# Patient Record
Sex: FEMALE | Race: WHITE
[De-identification: names, ages, dates, MRNs, and addresses within clinical notes are randomized per-mention and may not be internally consistent; named-entity substitution may affect disease eponyms.]

---

## 2017-03-23 ENCOUNTER — HOSPITAL ENCOUNTER (EMERGENCY)
Dept: HOSPITAL 8 - ED | Age: 22
LOS: 1 days | Discharge: HOME | End: 2017-03-24
Payer: COMMERCIAL

## 2017-03-23 VITALS — HEIGHT: 69 IN | BODY MASS INDEX: 28.24 KG/M2 | WEIGHT: 190.7 LBS

## 2017-03-23 DIAGNOSIS — K29.00: Primary | ICD-10-CM

## 2017-03-23 LAB
AST SERPL-CCNC: 11 U/L (ref 15–37)
BUN SERPL-MCNC: 8 MG/DL (ref 7–18)
HGB BLD-MCNC: 12.1 G/DL (ref 11.7–16.4)

## 2017-03-23 PROCEDURE — 76700 US EXAM ABDOM COMPLETE: CPT

## 2017-03-23 PROCEDURE — 87086 URINE CULTURE/COLONY COUNT: CPT

## 2017-03-23 PROCEDURE — 83690 ASSAY OF LIPASE: CPT

## 2017-03-23 PROCEDURE — 85025 COMPLETE CBC W/AUTO DIFF WBC: CPT

## 2017-03-23 PROCEDURE — 93005 ELECTROCARDIOGRAM TRACING: CPT

## 2017-03-23 PROCEDURE — 96372 THER/PROPH/DIAG INJ SC/IM: CPT

## 2017-03-23 PROCEDURE — 99285 EMERGENCY DEPT VISIT HI MDM: CPT

## 2017-03-23 PROCEDURE — 96374 THER/PROPH/DIAG INJ IV PUSH: CPT

## 2017-03-23 PROCEDURE — 96375 TX/PRO/DX INJ NEW DRUG ADDON: CPT

## 2017-03-23 PROCEDURE — 86677 HELICOBACTER PYLORI ANTIBODY: CPT

## 2017-03-23 PROCEDURE — 84703 CHORIONIC GONADOTROPIN ASSAY: CPT

## 2017-03-23 PROCEDURE — 81001 URINALYSIS AUTO W/SCOPE: CPT

## 2017-03-23 PROCEDURE — 36415 COLL VENOUS BLD VENIPUNCTURE: CPT

## 2017-03-23 PROCEDURE — 96361 HYDRATE IV INFUSION ADD-ON: CPT

## 2017-03-23 PROCEDURE — 80053 COMPREHEN METABOLIC PANEL: CPT

## 2017-03-24 VITALS — DIASTOLIC BLOOD PRESSURE: 68 MMHG | SYSTOLIC BLOOD PRESSURE: 116 MMHG

## 2017-03-24 LAB — LG PLATELETS BLD QL SMEAR: (no result)

## 2017-04-24 ENCOUNTER — HOSPITAL ENCOUNTER (EMERGENCY)
Dept: HOSPITAL 8 - ED | Age: 22
LOS: 1 days | Discharge: HOME | End: 2017-04-25
Payer: COMMERCIAL

## 2017-04-24 VITALS — BODY MASS INDEX: 28.24 KG/M2 | HEIGHT: 69 IN | WEIGHT: 190.7 LBS

## 2017-04-24 DIAGNOSIS — G43.C0: Primary | ICD-10-CM

## 2017-04-24 PROCEDURE — 99285 EMERGENCY DEPT VISIT HI MDM: CPT

## 2017-04-24 PROCEDURE — 85025 COMPLETE CBC W/AUTO DIFF WBC: CPT

## 2017-04-24 PROCEDURE — 82040 ASSAY OF SERUM ALBUMIN: CPT

## 2017-04-24 PROCEDURE — 81001 URINALYSIS AUTO W/SCOPE: CPT

## 2017-04-24 PROCEDURE — 84703 CHORIONIC GONADOTROPIN ASSAY: CPT

## 2017-04-24 PROCEDURE — 70450 CT HEAD/BRAIN W/O DYE: CPT

## 2017-04-24 PROCEDURE — 96361 HYDRATE IV INFUSION ADD-ON: CPT

## 2017-04-24 PROCEDURE — 36415 COLL VENOUS BLD VENIPUNCTURE: CPT

## 2017-04-24 PROCEDURE — 96374 THER/PROPH/DIAG INJ IV PUSH: CPT

## 2017-04-24 PROCEDURE — 80048 BASIC METABOLIC PNL TOTAL CA: CPT

## 2017-04-24 PROCEDURE — 96375 TX/PRO/DX INJ NEW DRUG ADDON: CPT

## 2017-04-25 VITALS — DIASTOLIC BLOOD PRESSURE: 71 MMHG | SYSTOLIC BLOOD PRESSURE: 130 MMHG

## 2017-04-25 LAB — BUN SERPL-MCNC: 7 MG/DL (ref 7–18)

## 2017-05-30 ENCOUNTER — HOSPITAL ENCOUNTER (EMERGENCY)
Dept: HOSPITAL 8 - ED | Age: 22
LOS: 1 days | Discharge: HOME | End: 2017-05-31
Payer: COMMERCIAL

## 2017-05-30 VITALS — WEIGHT: 197.09 LBS | BODY MASS INDEX: 29.19 KG/M2 | HEIGHT: 69 IN
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
## 2017-05-30 DIAGNOSIS — Z3A.01: ICD-10-CM
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
## 2017-05-30 DIAGNOSIS — S30.1XXA: ICD-10-CM

## 2017-05-30 DIAGNOSIS — O9A.211: Primary | ICD-10-CM

## 2017-05-30 DIAGNOSIS — G43.909: ICD-10-CM
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
## 2017-05-30 LAB
HCG UR LOT: (no result)
HCG UR OBC: (no result)

## 2017-05-30 PROCEDURE — 84702 CHORIONIC GONADOTROPIN TEST: CPT

## 2017-05-30 PROCEDURE — 81025 URINE PREGNANCY TEST: CPT

## 2017-05-30 PROCEDURE — 36415 COLL VENOUS BLD VENIPUNCTURE: CPT

## 2017-05-30 PROCEDURE — 76830 TRANSVAGINAL US NON-OB: CPT

## 2017-05-31 VITALS — SYSTOLIC BLOOD PRESSURE: 119 MMHG | DIASTOLIC BLOOD PRESSURE: 71 MMHG

## 2017-06-25 ENCOUNTER — HOSPITAL ENCOUNTER (EMERGENCY)
Dept: HOSPITAL 8 - ED | Age: 22
Discharge: HOME | End: 2017-06-25
Payer: COMMERCIAL

## 2017-06-25 VITALS — DIASTOLIC BLOOD PRESSURE: 68 MMHG | SYSTOLIC BLOOD PRESSURE: 124 MMHG

## 2017-06-25 VITALS — HEIGHT: 69 IN | BODY MASS INDEX: 28.73 KG/M2 | WEIGHT: 194.01 LBS

## 2017-06-25 DIAGNOSIS — Z3A.01: ICD-10-CM

## 2017-06-25 DIAGNOSIS — O20.0: Primary | ICD-10-CM

## 2017-06-25 DIAGNOSIS — R10.2: ICD-10-CM

## 2017-06-25 DIAGNOSIS — O26.891: ICD-10-CM

## 2017-06-25 LAB
AST SERPL-CCNC: 15 U/L (ref 15–37)
BUN SERPL-MCNC: 5 MG/DL (ref 7–18)
DIFF TOTAL CELLS COUNTED: (no result)
VERIFY COUNTS?: YES

## 2017-06-25 PROCEDURE — 80053 COMPREHEN METABOLIC PANEL: CPT

## 2017-06-25 PROCEDURE — 85025 COMPLETE CBC W/AUTO DIFF WBC: CPT

## 2017-06-25 PROCEDURE — 36415 COLL VENOUS BLD VENIPUNCTURE: CPT

## 2017-06-25 PROCEDURE — 84702 CHORIONIC GONADOTROPIN TEST: CPT

## 2017-06-25 PROCEDURE — 87086 URINE CULTURE/COLONY COUNT: CPT

## 2017-06-25 PROCEDURE — 99285 EMERGENCY DEPT VISIT HI MDM: CPT

## 2017-06-25 PROCEDURE — 81001 URINALYSIS AUTO W/SCOPE: CPT

## 2017-06-25 PROCEDURE — 86901 BLOOD TYPING SEROLOGIC RH(D): CPT

## 2017-06-25 PROCEDURE — 76801 OB US < 14 WKS SINGLE FETUS: CPT

## 2017-09-28 ENCOUNTER — HOSPITAL ENCOUNTER (OUTPATIENT)
Dept: HOSPITAL 8 - LDOP | Age: 22
Discharge: HOME | End: 2017-09-28
Attending: OBSTETRICS & GYNECOLOGY
Payer: COMMERCIAL

## 2017-09-28 DIAGNOSIS — R10.9: ICD-10-CM

## 2017-09-28 DIAGNOSIS — O26.892: Primary | ICD-10-CM

## 2017-09-28 DIAGNOSIS — Z3A.20: ICD-10-CM

## 2017-09-28 PROCEDURE — 59025 FETAL NON-STRESS TEST: CPT

## 2017-09-28 PROCEDURE — G0463 HOSPITAL OUTPT CLINIC VISIT: HCPCS

## 2017-09-28 PROCEDURE — 99211 OFF/OP EST MAY X REQ PHY/QHP: CPT

## 2017-09-28 PROCEDURE — 87086 URINE CULTURE/COLONY COUNT: CPT

## 2017-09-28 PROCEDURE — 81001 URINALYSIS AUTO W/SCOPE: CPT

## 2017-10-14 ENCOUNTER — HOSPITAL ENCOUNTER (OUTPATIENT)
Dept: HOSPITAL 8 - LDOP | Age: 22
Discharge: HOME | End: 2017-10-14
Attending: OBSTETRICS & GYNECOLOGY
Payer: COMMERCIAL

## 2017-10-14 VITALS — BODY MASS INDEX: 27.76 KG/M2 | HEIGHT: 69 IN | WEIGHT: 187.39 LBS

## 2017-10-14 VITALS — DIASTOLIC BLOOD PRESSURE: 61 MMHG | SYSTOLIC BLOOD PRESSURE: 121 MMHG

## 2017-10-14 DIAGNOSIS — O26.892: ICD-10-CM

## 2017-10-14 DIAGNOSIS — R10.9: ICD-10-CM

## 2017-10-14 DIAGNOSIS — Z3A.20: ICD-10-CM

## 2017-10-14 DIAGNOSIS — O36.8120: Primary | ICD-10-CM

## 2017-10-14 PROCEDURE — G0463 HOSPITAL OUTPT CLINIC VISIT: HCPCS

## 2017-10-14 PROCEDURE — 59025 FETAL NON-STRESS TEST: CPT

## 2017-10-14 PROCEDURE — 99211 OFF/OP EST MAY X REQ PHY/QHP: CPT

## 2017-10-22 ENCOUNTER — HOSPITAL ENCOUNTER (EMERGENCY)
Dept: HOSPITAL 8 - ED | Age: 22
Discharge: HOME | End: 2017-10-22
Payer: COMMERCIAL

## 2017-10-22 VITALS — HEIGHT: 69 IN | BODY MASS INDEX: 28.31 KG/M2 | WEIGHT: 191.14 LBS

## 2017-10-22 VITALS — DIASTOLIC BLOOD PRESSURE: 47 MMHG | SYSTOLIC BLOOD PRESSURE: 112 MMHG

## 2017-10-22 DIAGNOSIS — G43.909: ICD-10-CM

## 2017-10-22 DIAGNOSIS — O99.282: ICD-10-CM

## 2017-10-22 DIAGNOSIS — O99.352: Primary | ICD-10-CM

## 2017-10-22 DIAGNOSIS — R55: ICD-10-CM

## 2017-10-22 DIAGNOSIS — E86.0: ICD-10-CM

## 2017-10-22 DIAGNOSIS — Z3A.24: ICD-10-CM

## 2017-10-22 LAB
AST SERPL-CCNC: 23 U/L (ref 15–37)
BUN SERPL-MCNC: 5 MG/DL (ref 7–18)
HCT VFR BLD CALC: 35.5 % (ref 34.6–47.8)
HGB BLD-MCNC: 11.5 G/DL (ref 11.7–16.4)
WBC # BLD AUTO: 12.6 X10^3/UL (ref 3.4–10)

## 2017-10-22 PROCEDURE — 81003 URINALYSIS AUTO W/O SCOPE: CPT

## 2017-10-22 PROCEDURE — 80053 COMPREHEN METABOLIC PANEL: CPT

## 2017-10-22 PROCEDURE — 99285 EMERGENCY DEPT VISIT HI MDM: CPT

## 2017-10-22 PROCEDURE — 96374 THER/PROPH/DIAG INJ IV PUSH: CPT

## 2017-10-22 PROCEDURE — 85025 COMPLETE CBC W/AUTO DIFF WBC: CPT

## 2017-10-22 PROCEDURE — 36415 COLL VENOUS BLD VENIPUNCTURE: CPT

## 2017-10-22 PROCEDURE — 96361 HYDRATE IV INFUSION ADD-ON: CPT

## 2017-10-22 PROCEDURE — 93005 ELECTROCARDIOGRAM TRACING: CPT

## 2017-12-13 ENCOUNTER — HOSPITAL ENCOUNTER (OUTPATIENT)
Dept: HOSPITAL 8 - LDOP | Age: 22
Discharge: HOME | End: 2017-12-13
Attending: OBSTETRICS & GYNECOLOGY
Payer: COMMERCIAL

## 2017-12-13 VITALS — SYSTOLIC BLOOD PRESSURE: 106 MMHG | DIASTOLIC BLOOD PRESSURE: 57 MMHG

## 2017-12-13 DIAGNOSIS — Z3A.31: ICD-10-CM

## 2017-12-13 LAB
A1 MICROGLOB PLACENTAL VAG QL: NEGATIVE
AMNI LOT: (no result)
AMNI OBC: (no result)

## 2017-12-13 PROCEDURE — 59025 FETAL NON-STRESS TEST: CPT

## 2017-12-13 PROCEDURE — 84112 EVAL AMNIOTIC FLUID PROTEIN: CPT

## 2017-12-13 PROCEDURE — 99211 OFF/OP EST MAY X REQ PHY/QHP: CPT

## 2017-12-13 PROCEDURE — 87086 URINE CULTURE/COLONY COUNT: CPT

## 2017-12-13 PROCEDURE — G0463 HOSPITAL OUTPT CLINIC VISIT: HCPCS

## 2017-12-13 PROCEDURE — 81001 URINALYSIS AUTO W/SCOPE: CPT

## 2017-12-17 ENCOUNTER — HOSPITAL ENCOUNTER (OUTPATIENT)
Dept: HOSPITAL 8 - LDOP | Age: 22
Discharge: HOME | End: 2017-12-17
Attending: OBSTETRICS & GYNECOLOGY
Payer: COMMERCIAL

## 2017-12-17 VITALS — BODY MASS INDEX: 28.35 KG/M2 | WEIGHT: 191.38 LBS | HEIGHT: 69 IN

## 2017-12-17 VITALS — DIASTOLIC BLOOD PRESSURE: 70 MMHG | SYSTOLIC BLOOD PRESSURE: 133 MMHG

## 2017-12-17 DIAGNOSIS — R10.9: ICD-10-CM

## 2017-12-17 DIAGNOSIS — Z3A.32: ICD-10-CM

## 2017-12-17 DIAGNOSIS — O23.593: ICD-10-CM

## 2017-12-17 DIAGNOSIS — M54.9: ICD-10-CM

## 2017-12-17 DIAGNOSIS — O26.893: Primary | ICD-10-CM

## 2017-12-17 LAB
A1 MICROGLOB PLACENTAL VAG QL: NEGATIVE
AMNI LOT: (no result)
AMNI OBC: (no result)
T VAGINALIS RRNA GENITAL QL PROBE: (no result)

## 2017-12-17 PROCEDURE — 87210 SMEAR WET MOUNT SALINE/INK: CPT

## 2017-12-17 PROCEDURE — 59025 FETAL NON-STRESS TEST: CPT

## 2017-12-17 PROCEDURE — 87086 URINE CULTURE/COLONY COUNT: CPT

## 2017-12-17 PROCEDURE — 87808 TRICHOMONAS ASSAY W/OPTIC: CPT

## 2017-12-17 PROCEDURE — G0463 HOSPITAL OUTPT CLINIC VISIT: HCPCS

## 2017-12-17 PROCEDURE — 81001 URINALYSIS AUTO W/SCOPE: CPT

## 2017-12-17 PROCEDURE — 84112 EVAL AMNIOTIC FLUID PROTEIN: CPT

## 2017-12-17 PROCEDURE — 99211 OFF/OP EST MAY X REQ PHY/QHP: CPT

## 2017-12-22 ENCOUNTER — HOSPITAL ENCOUNTER (OUTPATIENT)
Dept: HOSPITAL 8 - LDOP | Age: 22
Discharge: HOME | End: 2017-12-22
Attending: OBSTETRICS & GYNECOLOGY
Payer: COMMERCIAL

## 2017-12-22 VITALS — DIASTOLIC BLOOD PRESSURE: 76 MMHG | SYSTOLIC BLOOD PRESSURE: 125 MMHG

## 2017-12-22 DIAGNOSIS — Z3A.32: ICD-10-CM

## 2017-12-22 LAB
A1 MICROGLOB PLACENTAL VAG QL: NEGATIVE
AMNI LOT: (no result)
AMNI OBC: (no result)

## 2017-12-22 PROCEDURE — 84112 EVAL AMNIOTIC FLUID PROTEIN: CPT

## 2017-12-22 PROCEDURE — 99211 OFF/OP EST MAY X REQ PHY/QHP: CPT

## 2017-12-22 PROCEDURE — 89060 EXAM SYNOVIAL FLUID CRYSTALS: CPT

## 2017-12-22 PROCEDURE — 59025 FETAL NON-STRESS TEST: CPT

## 2017-12-22 PROCEDURE — 76815 OB US LIMITED FETUS(S): CPT

## 2017-12-22 PROCEDURE — G0463 HOSPITAL OUTPT CLINIC VISIT: HCPCS

## 2018-01-07 ENCOUNTER — HOSPITAL ENCOUNTER (OUTPATIENT)
Dept: HOSPITAL 8 - LDOP | Age: 23
LOS: 1 days | Discharge: HOME | End: 2018-01-08
Attending: OBSTETRICS & GYNECOLOGY
Payer: MEDICAID

## 2018-01-07 VITALS — DIASTOLIC BLOOD PRESSURE: 78 MMHG | SYSTOLIC BLOOD PRESSURE: 129 MMHG

## 2018-01-07 DIAGNOSIS — Z3A.36: ICD-10-CM

## 2018-01-07 DIAGNOSIS — M54.9: ICD-10-CM

## 2018-01-07 DIAGNOSIS — O26.893: ICD-10-CM

## 2018-01-07 DIAGNOSIS — R10.9: ICD-10-CM

## 2018-01-07 PROCEDURE — 59025 FETAL NON-STRESS TEST: CPT

## 2018-01-07 PROCEDURE — 99211 OFF/OP EST MAY X REQ PHY/QHP: CPT

## 2018-01-07 PROCEDURE — 76815 OB US LIMITED FETUS(S): CPT

## 2018-01-07 PROCEDURE — 89060 EXAM SYNOVIAL FLUID CRYSTALS: CPT

## 2018-01-07 PROCEDURE — G0463 HOSPITAL OUTPT CLINIC VISIT: HCPCS

## 2018-01-17 ENCOUNTER — HOSPITAL ENCOUNTER (EMERGENCY)
Dept: HOSPITAL 8 - ED | Age: 23
Discharge: HOME | End: 2018-01-17
Payer: COMMERCIAL

## 2018-01-17 VITALS — HEIGHT: 69 IN | BODY MASS INDEX: 29.39 KG/M2 | WEIGHT: 198.42 LBS

## 2018-01-17 VITALS — DIASTOLIC BLOOD PRESSURE: 68 MMHG | SYSTOLIC BLOOD PRESSURE: 124 MMHG

## 2018-01-17 DIAGNOSIS — R73.09: ICD-10-CM

## 2018-01-17 DIAGNOSIS — G43.909: ICD-10-CM
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
## 2018-01-17 DIAGNOSIS — Z3A.36: ICD-10-CM
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
## 2018-01-17 DIAGNOSIS — S79.921A: ICD-10-CM
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
## 2018-01-17 DIAGNOSIS — O26.893: Primary | ICD-10-CM

## 2018-01-17 DIAGNOSIS — S79.922A: ICD-10-CM

## 2018-01-17 PROCEDURE — 82962 GLUCOSE BLOOD TEST: CPT

## 2018-01-17 PROCEDURE — 99281 EMR DPT VST MAYX REQ PHY/QHP: CPT

## 2018-01-23 ENCOUNTER — HOSPITAL ENCOUNTER (OUTPATIENT)
Dept: HOSPITAL 8 - LDOP | Age: 23
Discharge: HOME | End: 2018-01-23
Attending: OBSTETRICS & GYNECOLOGY
Payer: COMMERCIAL

## 2018-01-23 VITALS — BODY MASS INDEX: 29.68 KG/M2 | WEIGHT: 200.4 LBS | HEIGHT: 69 IN

## 2018-01-23 VITALS — SYSTOLIC BLOOD PRESSURE: 120 MMHG | DIASTOLIC BLOOD PRESSURE: 71 MMHG

## 2018-01-23 DIAGNOSIS — Z3A.37: ICD-10-CM

## 2018-01-23 DIAGNOSIS — O26.893: Primary | ICD-10-CM

## 2018-01-23 DIAGNOSIS — R10.9: ICD-10-CM

## 2018-01-23 PROCEDURE — 89060 EXAM SYNOVIAL FLUID CRYSTALS: CPT

## 2018-01-23 PROCEDURE — 99211 OFF/OP EST MAY X REQ PHY/QHP: CPT

## 2018-01-23 PROCEDURE — G0463 HOSPITAL OUTPT CLINIC VISIT: HCPCS

## 2018-01-23 PROCEDURE — 59025 FETAL NON-STRESS TEST: CPT

## 2018-02-03 ENCOUNTER — HOSPITAL ENCOUNTER (INPATIENT)
Facility: MEDICAL CENTER | Age: 23
LOS: 2 days | End: 2018-02-05
Attending: OBSTETRICS & GYNECOLOGY | Admitting: OBSTETRICS & GYNECOLOGY
Payer: COMMERCIAL

## 2018-02-03 LAB
BASOPHILS # BLD AUTO: 0.5 % (ref 0–1.8)
BASOPHILS # BLD: 0.03 K/UL (ref 0–0.12)
EOSINOPHIL # BLD AUTO: 0.05 K/UL (ref 0–0.51)
EOSINOPHIL NFR BLD: 0.8 % (ref 0–6.9)
ERYTHROCYTE [DISTWIDTH] IN BLOOD BY AUTOMATED COUNT: 40.6 FL (ref 35.9–50)
HCT VFR BLD AUTO: 30 % (ref 37–47)
HGB BLD-MCNC: 9.3 G/DL (ref 12–16)
HOLDING TUBE BB 8507: NORMAL
IMM GRANULOCYTES # BLD AUTO: 0.04 K/UL (ref 0–0.11)
IMM GRANULOCYTES NFR BLD AUTO: 0.6 % (ref 0–0.9)
LYMPHOCYTES # BLD AUTO: 2.58 K/UL (ref 1–4.8)
LYMPHOCYTES NFR BLD: 40.4 % (ref 22–41)
MCH RBC QN AUTO: 21.1 PG (ref 27–33)
MCHC RBC AUTO-ENTMCNC: 31 G/DL (ref 33.6–35)
MCV RBC AUTO: 68 FL (ref 81.4–97.8)
MONOCYTES # BLD AUTO: 0.55 K/UL (ref 0–0.85)
MONOCYTES NFR BLD AUTO: 8.6 % (ref 0–13.4)
NEUTROPHILS # BLD AUTO: 3.14 K/UL (ref 2–7.15)
NEUTROPHILS NFR BLD: 49.1 % (ref 44–72)
NRBC # BLD AUTO: 0 K/UL
NRBC BLD-RTO: 0 /100 WBC
PLATELET # BLD AUTO: 142 K/UL (ref 164–446)
RBC # BLD AUTO: 4.41 M/UL (ref 4.2–5.4)
WBC # BLD AUTO: 6.4 K/UL (ref 4.8–10.8)

## 2018-02-03 PROCEDURE — 304965 HCHG RECOVERY SERVICES

## 2018-02-03 PROCEDURE — 700111 HCHG RX REV CODE 636 W/ 250 OVERRIDE (IP): Performed by: OBSTETRICS & GYNECOLOGY

## 2018-02-03 PROCEDURE — 303615 HCHG EPIDURAL/SPINAL ANESTHESIA FOR LABOR

## 2018-02-03 PROCEDURE — 770002 HCHG ROOM/CARE - OB PRIVATE (112)

## 2018-02-03 PROCEDURE — 4A1HXCZ MONITORING OF PRODUCTS OF CONCEPTION, CARDIAC RATE, EXTERNAL APPROACH: ICD-10-PCS | Performed by: OBSTETRICS & GYNECOLOGY

## 2018-02-03 PROCEDURE — 85025 COMPLETE CBC W/AUTO DIFF WBC: CPT

## 2018-02-03 PROCEDURE — 700105 HCHG RX REV CODE 258: Performed by: OBSTETRICS & GYNECOLOGY

## 2018-02-03 PROCEDURE — 36415 COLL VENOUS BLD VENIPUNCTURE: CPT

## 2018-02-03 PROCEDURE — 59409 OBSTETRICAL CARE: CPT

## 2018-02-03 PROCEDURE — 700102 HCHG RX REV CODE 250 W/ 637 OVERRIDE(OP): Performed by: OBSTETRICS & GYNECOLOGY

## 2018-02-03 PROCEDURE — A9270 NON-COVERED ITEM OR SERVICE: HCPCS | Performed by: OBSTETRICS & GYNECOLOGY

## 2018-02-03 PROCEDURE — 700105 HCHG RX REV CODE 258

## 2018-02-03 PROCEDURE — 700111 HCHG RX REV CODE 636 W/ 250 OVERRIDE (IP)

## 2018-02-03 RX ORDER — IBUPROFEN 600 MG/1
600 TABLET ORAL EVERY 6 HOURS PRN
Status: DISCONTINUED | OUTPATIENT
Start: 2018-02-03 | End: 2018-02-05 | Stop reason: HOSPADM

## 2018-02-03 RX ORDER — ALUMINA, MAGNESIA, AND SIMETHICONE 2400; 2400; 240 MG/30ML; MG/30ML; MG/30ML
30 SUSPENSION ORAL EVERY 6 HOURS PRN
Status: DISCONTINUED | OUTPATIENT
Start: 2018-02-03 | End: 2018-02-03 | Stop reason: HOSPADM

## 2018-02-03 RX ORDER — MISOPROSTOL 200 UG/1
800 TABLET ORAL
Status: COMPLETED | OUTPATIENT
Start: 2018-02-03 | End: 2018-02-03

## 2018-02-03 RX ORDER — OXYTOCIN 10 [USP'U]/ML
INJECTION, SOLUTION INTRAMUSCULAR; INTRAVENOUS
Status: COMPLETED
Start: 2018-02-03 | End: 2018-02-03

## 2018-02-03 RX ORDER — ONDANSETRON 2 MG/ML
4 INJECTION INTRAMUSCULAR; INTRAVENOUS EVERY 6 HOURS PRN
Status: DISCONTINUED | OUTPATIENT
Start: 2018-02-03 | End: 2018-02-05 | Stop reason: HOSPADM

## 2018-02-03 RX ORDER — SODIUM CHLORIDE, SODIUM LACTATE, POTASSIUM CHLORIDE, CALCIUM CHLORIDE 600; 310; 30; 20 MG/100ML; MG/100ML; MG/100ML; MG/100ML
INJECTION, SOLUTION INTRAVENOUS PRN
Status: DISCONTINUED | OUTPATIENT
Start: 2018-02-03 | End: 2018-02-05 | Stop reason: HOSPADM

## 2018-02-03 RX ORDER — ROPIVACAINE HYDROCHLORIDE 2 MG/ML
INJECTION, SOLUTION EPIDURAL; INFILTRATION; PERINEURAL
Status: COMPLETED
Start: 2018-02-03 | End: 2018-02-03

## 2018-02-03 RX ORDER — SODIUM CHLORIDE, SODIUM LACTATE, POTASSIUM CHLORIDE, CALCIUM CHLORIDE 600; 310; 30; 20 MG/100ML; MG/100ML; MG/100ML; MG/100ML
INJECTION, SOLUTION INTRAVENOUS CONTINUOUS
Status: DISPENSED | OUTPATIENT
Start: 2018-02-03 | End: 2018-02-03

## 2018-02-03 RX ORDER — ONDANSETRON 2 MG/ML
INJECTION INTRAMUSCULAR; INTRAVENOUS
Status: COMPLETED
Start: 2018-02-03 | End: 2018-02-03

## 2018-02-03 RX ORDER — OXYCODONE HYDROCHLORIDE AND ACETAMINOPHEN 5; 325 MG/1; MG/1
1-2 TABLET ORAL EVERY 4 HOURS PRN
Status: DISCONTINUED | OUTPATIENT
Start: 2018-02-03 | End: 2018-02-05 | Stop reason: HOSPADM

## 2018-02-03 RX ORDER — ACETAMINOPHEN 325 MG/1
650 TABLET ORAL EVERY 4 HOURS PRN
Status: DISCONTINUED | OUTPATIENT
Start: 2018-02-03 | End: 2018-02-05 | Stop reason: HOSPADM

## 2018-02-03 RX ORDER — METHYLERGONOVINE MALEATE 0.2 MG/ML
0.2 INJECTION INTRAVENOUS
Status: DISCONTINUED | OUTPATIENT
Start: 2018-02-03 | End: 2018-02-05 | Stop reason: HOSPADM

## 2018-02-03 RX ORDER — METHYLERGONOVINE MALEATE 0.2 MG/ML
0.2 INJECTION INTRAVENOUS
Status: COMPLETED | OUTPATIENT
Start: 2018-02-03 | End: 2018-02-03

## 2018-02-03 RX ORDER — DOCUSATE SODIUM 100 MG/1
100 CAPSULE, LIQUID FILLED ORAL 2 TIMES DAILY PRN
Status: DISCONTINUED | OUTPATIENT
Start: 2018-02-03 | End: 2018-02-05 | Stop reason: HOSPADM

## 2018-02-03 RX ORDER — SODIUM CHLORIDE, SODIUM LACTATE, POTASSIUM CHLORIDE, CALCIUM CHLORIDE 600; 310; 30; 20 MG/100ML; MG/100ML; MG/100ML; MG/100ML
INJECTION, SOLUTION INTRAVENOUS
Status: COMPLETED
Start: 2018-02-03 | End: 2018-02-03

## 2018-02-03 RX ORDER — DEXTROSE, SODIUM CHLORIDE, SODIUM LACTATE, POTASSIUM CHLORIDE, AND CALCIUM CHLORIDE 5; .6; .31; .03; .02 G/100ML; G/100ML; G/100ML; G/100ML; G/100ML
INJECTION, SOLUTION INTRAVENOUS CONTINUOUS
Status: DISCONTINUED | OUTPATIENT
Start: 2018-02-03 | End: 2018-02-03 | Stop reason: HOSPADM

## 2018-02-03 RX ADMIN — Medication 2000 ML/HR: at 16:59

## 2018-02-03 RX ADMIN — IBUPROFEN 600 MG: 600 TABLET, FILM COATED ORAL at 20:55

## 2018-02-03 RX ADMIN — MISOPROSTOL 800 MCG: 200 TABLET ORAL at 17:02

## 2018-02-03 RX ADMIN — ONDANSETRON 4 MG: 2 INJECTION INTRAMUSCULAR; INTRAVENOUS at 16:00

## 2018-02-03 RX ADMIN — OXYTOCIN 10 UNITS: 10 INJECTION, SOLUTION INTRAMUSCULAR; INTRAVENOUS at 17:09

## 2018-02-03 RX ADMIN — SODIUM CHLORIDE, POTASSIUM CHLORIDE, SODIUM LACTATE AND CALCIUM CHLORIDE: 600; 310; 30; 20 INJECTION, SOLUTION INTRAVENOUS at 14:15

## 2018-02-03 RX ADMIN — Medication 1 MILLI-UNITS/MIN: at 08:00

## 2018-02-03 RX ADMIN — SODIUM CHLORIDE, POTASSIUM CHLORIDE, SODIUM LACTATE AND CALCIUM CHLORIDE 1000 ML: 600; 310; 30; 20 INJECTION, SOLUTION INTRAVENOUS at 07:18

## 2018-02-03 RX ADMIN — SODIUM CHLORIDE, POTASSIUM CHLORIDE, SODIUM LACTATE AND CALCIUM CHLORIDE: 600; 310; 30; 20 INJECTION, SOLUTION INTRAVENOUS at 13:30

## 2018-02-03 RX ADMIN — OXYCODONE HYDROCHLORIDE AND ACETAMINOPHEN 1 TABLET: 5; 325 TABLET ORAL at 20:55

## 2018-02-03 RX ADMIN — ROPIVACAINE HYDROCHLORIDE 100 ML: 2 INJECTION, SOLUTION EPIDURAL; INFILTRATION; PERINEURAL at 13:35

## 2018-02-03 RX ADMIN — Medication 125 ML/HR: at 17:19

## 2018-02-03 RX ADMIN — METHYLERGONOVINE MALEATE 0.2 MG: 0.2 INJECTION, SOLUTION INTRAMUSCULAR; INTRAVENOUS at 17:09

## 2018-02-03 ASSESSMENT — LIFESTYLE VARIABLES
EVER_SMOKED: NEVER
ALCOHOL_USE: NO
DO YOU DRINK ALCOHOL: NO

## 2018-02-03 ASSESSMENT — PAIN SCALES - GENERAL
PAINLEVEL_OUTOF10: 0
PAINLEVEL_OUTOF10: 5

## 2018-02-03 NOTE — H&P
"  History and Physical    Tiara Martin is a 22 y.o. female  -Para:   No obstetric history on file.  Gestational Age:  39w0d  Admitted for:   Induction of Labor  Admitted to  [facility] Delivery.  Patient received prenatal care: Dr. Barros    HPI: Patient is admitted with the above mentioned Chief Complaint and States   Loss of fluid:   negative  Abdominal Pain:  positive  Uterine Contractions:  positive  Vaginal Bleeding:  negative  Fetal Movement:  normal  Patient denies fever, chills, nausea, vomiting , headache, visual disturbance, or dysuria  No LMP recorded.  Estimated Date of Delivery: None noted.  Final DAVID: Not found.    There are no active problems to display for this patient.      Admitting DX: Pregnancy  IOL  Indication for care in labor or delivery  Pregnancy Complications:  none  OB Risk Factors:   Short interval from last pregnancy  Labor State:    Early latent labor.    History:   has no past medical history on file.     has no past surgical history on file.    OB History   No data available       Medications:  No current facility-administered medications on file prior to encounter.      Current Outpatient Prescriptions on File Prior to Encounter   Medication Sig Dispense Refill   • NON SPECIFIED BCP          Allergies:  Patient has no known allergies.    ROS:   Neuro: negative    Cardiovascular: negative  Gastro intestinal: negative  Genitourinary: positive for vaginal discharge            Physical Exam:  /58   Pulse 68   Temp 36.1 °C (97 °F)   Resp 16   Ht 1.753 m (5' 9\")   Wt 91.2 kg (201 lb)   BMI 29.68 kg/m²   Constitutional: healthy-appearing        Cardio: regular rate and rhythm, S1, S2 normal, no murmur, click, rub or gallop  Lung: unlabored respirations, no intercostal retractions or accessory muscle use  Abdomen: gravid abdomen   Extremity: extremities, peripheral pulses and reflexes normal    Cervical Exam: 90%  Cervix Dilatation: 4  Station: positive " 0  Pelvis: Normal  Fetal Assessment: Fetal heart variability: moderate  Estimated Fetal Weight: 3000 - 3500g      Labs:  Recent Labs      18   0715   WBC  6.4   RBC  4.41   HEMOGLOBIN  9.3*   HEMATOCRIT  30.0*   MCV  68.0*   MCH  21.1*   MCHC  31.0*   RDW  40.6   PLATELETCT  142*     Prenatal Results    The patient does not have an associated pregnancy episode and working DAVID. Some results will not display without a pregnancy episode and working DAVID.   1st Trimester     Test Value Date Time    ABO       RH       Antibody       CBC/PLT/DIFF       HGB       Platelets       HGB A1C        1 Hr GCT       3 Hr GTT       Rubella       RPR       Urine Culture       24 Urine Protein        24 Urine Creatinine        HBsAg       Hep CAB        HIV       Gonorrhea       Chlamydia       TSH        Free T4         TB       Pap       SYPHILUS TREP QUAL [id number] [[id number]][             2nd Trimester     Test Value Date Time    HCT       HGB       1 Hr GCT       3 Hr GTT             24-28 Weeks     Test Value Date Time    1 Hr GCT        TSH        Free T4        24 Urine Protein       24 Urine Creatinine       BUN       Creatinine       GFR       AST       ALT       Uric Acid       LDH             3rd Trimester     Test Value Date Time    HCT       HGB       TSH       Free T4       24 Hr Urine Protein       24 Hr Urine Creatinine       SYPHILUS TREP QUAL             35-37 Weeks     Test Value Date Time    GBS PCR LB       GBS PCR             Genetic Screening     Test Value Date Time    Cystic Fibrosis       AFP Quad       Sickle Cell                     Assessment:  Gestational Age:  39w0d  Labor State:   Labor, Active  Risk Factors:   Short pregnancy interval  Pregnancy Complications: none    There are no active problems to display for this patient.      Plan:   Admitted for: Induction of Labor  TEJINDER Barros M.D.

## 2018-02-03 NOTE — PROGRESS NOTES
0620-Pt presents to unit for IOL. EDC 2/10/2018 39wks. Denies VB or LOF or Uc. Reports +FM. EFM and TOCO applied.   0645-Bedside report given to DALIA Swanson.

## 2018-02-03 NOTE — PROGRESS NOTES
"S/ very comfortable with contractions  O/  Vitals:    02/03/18 0625 02/03/18 0832   BP:  114/58   Pulse:  68   Resp:  16   Temp:  36.1 °C (97 °F)   Weight: 91.2 kg (201 lb)    Height: 1.753 m (5' 9\")      Sve-8/c/0, moderate bleeding on peripad  efm-cat 1  toco-q2-3  Pit at 13mu    A&P/ labor progressing well  Will follow.  "

## 2018-02-03 NOTE — PROGRESS NOTES
"; EDC 2/10; EGA 39    1220 - Report from TACHO Swanson RN. Pt resting comfortably in bed. Pt states that she is \"barely\" feeling her UCs at this time. Discussed pain management options, pt will let RN know when she is ready for intervention. Pitocin running per active order. Family at bedside. POC discussed, questions answered.   1300 - SROM, bloody fluid.   1317 - Dr. Barros at bedside. SVE 4/80/-1. Pt requesting epidural. Bolus started.   1330 - Dr. Brennan at bedside to place epidural.   1341 - Test dose given, pt tolerated well.   1400 - Schaeffer placed, pt tolerated well. SVE 5-6/90/-1  1520 - Dr. Barros at bedside. SVE 8/100/0. ARom of forebag, bloody fluid with clots.   1600 - Pt feeling nauseous, requesting zofran. Also feeling pressure. SVE 9/100/0, bloody show with clots present. Dr. Thompson called and updated, orders received.   1650 - Dr. Barros at bedside, SVE C/100/+1. Pushing with MD and RN  1655 -  of viable infant, 8/9 APGARs. No lacerations. Cytotec, IM pit, methergine given per MD order.    - Delivery of placenta   - Dr. Barros at bedside to assess pt. Fundus firm, 1 below U, bleeding light   - Report to SAKINA Casiano RN          "

## 2018-02-03 NOTE — CARE PLAN
Problem: Risk for Fluid Imbalance  Goal: Promotion of Fluid Balance    Intervention: Promote oral intake as appropriate  Water at BS      Problem: Risk for Infection, Impaired Wound Healing  Goal: Remain free from signs and symptoms of infection    Intervention: Infection prevention measures  Hand hygiene before and after pt care

## 2018-02-04 LAB
ERYTHROCYTE [DISTWIDTH] IN BLOOD BY AUTOMATED COUNT: 40.1 FL (ref 35.9–50)
HCT VFR BLD AUTO: 26.9 % (ref 37–47)
HGB BLD-MCNC: 8.2 G/DL (ref 12–16)
MCH RBC QN AUTO: 20.3 PG (ref 27–33)
MCHC RBC AUTO-ENTMCNC: 30.5 G/DL (ref 33.6–35)
MCV RBC AUTO: 66.7 FL (ref 81.4–97.8)
MORPHOLOGY BLD-IMP: NORMAL
PLATELET # BLD AUTO: 114 K/UL (ref 164–446)
RBC # BLD AUTO: 4.03 M/UL (ref 4.2–5.4)
WBC # BLD AUTO: 8.9 K/UL (ref 4.8–10.8)

## 2018-02-04 PROCEDURE — 85027 COMPLETE CBC AUTOMATED: CPT

## 2018-02-04 PROCEDURE — A9270 NON-COVERED ITEM OR SERVICE: HCPCS | Performed by: OBSTETRICS & GYNECOLOGY

## 2018-02-04 PROCEDURE — 700102 HCHG RX REV CODE 250 W/ 637 OVERRIDE(OP): Performed by: OBSTETRICS & GYNECOLOGY

## 2018-02-04 PROCEDURE — 770002 HCHG ROOM/CARE - OB PRIVATE (112)

## 2018-02-04 PROCEDURE — 36415 COLL VENOUS BLD VENIPUNCTURE: CPT

## 2018-02-04 RX ADMIN — IBUPROFEN 600 MG: 600 TABLET, FILM COATED ORAL at 07:31

## 2018-02-04 RX ADMIN — OXYCODONE HYDROCHLORIDE AND ACETAMINOPHEN 2 TABLET: 5; 325 TABLET ORAL at 09:59

## 2018-02-04 RX ADMIN — IBUPROFEN 600 MG: 600 TABLET, FILM COATED ORAL at 19:49

## 2018-02-04 ASSESSMENT — PAIN SCALES - GENERAL
PAINLEVEL_OUTOF10: 3
PAINLEVEL_OUTOF10: 7
PAINLEVEL_OUTOF10: 2
PAINLEVEL_OUTOF10: 2
PAINLEVEL_OUTOF10: 5
PAINLEVEL_OUTOF10: 3
PAINLEVEL_OUTOF10: 2

## 2018-02-04 ASSESSMENT — COPD QUESTIONNAIRES
DURING THE PAST 4 WEEKS HOW MUCH DID YOU FEEL SHORT OF BREATH: NONE/LITTLE OF THE TIME
DO YOU EVER COUGH UP ANY MUCUS OR PHLEGM?: NO/ONLY WITH OCCASIONAL COLDS OR INFECTIONS
HAVE YOU SMOKED AT LEAST 100 CIGARETTES IN YOUR ENTIRE LIFE: NO/DON'T KNOW

## 2018-02-04 NOTE — CARE PLAN
Problem: Potential for postpartum infection related to presence of episiotomy/vaginal tear and/or uterine contamination  Goal: Patient will be absent from signs and symptoms of infection  Outcome: PROGRESSING AS EXPECTED  No signs of infection noted at time of assessment    Problem: Alteration in comfort related to episiotomy, vaginal repair and/or after birth pains  Goal: Patient verbalizes acceptable pain level  Outcome: PROGRESSING AS EXPECTED  Pt claims to have good pain relief with p.o medications

## 2018-02-04 NOTE — PROGRESS NOTES
PPD#1 s/p   S. Feels ok, tired, less vb, baby latching well, Edel pos so will stay  O.  Vitals:    18 2008 18 0000 18 0400 18 0800   BP: 117/75 128/75 114/61 105/59   Pulse: 84 74 63 68   Resp:    Temp: 36.8 °C (98.2 °F) 37.2 °C (98.9 °F) 36.3 °C (97.4 °F) 36.6 °C (97.8 °F)   SpO2: 98% 96% 99% 96%   Weight:       Height:         Recent Labs      18   0715  18   0529   WBC  6.4  8.9   RBC  4.41  4.03*   HEMOGLOBIN  9.3*  8.2*   HEMATOCRIT  30.0*  26.9*   MCV  68.0*  66.7*   MCH  21.1*  20.3*   RDW  40.6  40.1   PLATELETCT  142*  114*   NEUTSPOLYS  49.10   --    LYMPHOCYTES  40.40   --    MONOCYTES  8.60   --    EOSINOPHILS  0.80   --    BASOPHILS  0.50   --      gen-comf  Abd-soft, ff below umb  ext-no edema    A&P/S/p term   Stable   Will stay until tomorrow am.

## 2018-02-04 NOTE — PROGRESS NOTES
Assessment done vital signs stable. Patient progressing according to plan of care. Fundus firm at the umbilicus with light lochia. Patient up voiding without difficulty. Ambulating with steady gait. Claims to have good pain relief with p.o medications. Breast feeding infant on demand. Family at bedside. Patient denies problems at this time. Patient encouraged to call for any needs.Pt claims she will call when medications are needed

## 2018-02-04 NOTE — PROGRESS NOTES
1900: report received from davina neves, assumed care of patient. Pt offered pain meds, pt declines.    1950: pt up to br, voided, pt transferred to pp in stable condition.  Report to laura neves

## 2018-02-04 NOTE — PROGRESS NOTES
Patient arrived to room 338 at 2010. Report received from Michelle Fowler RN. Educated patient on cuddles system, infant identification, emergency pull cord, and skylight.

## 2018-02-04 NOTE — CONSULTS
Spoke with mom to assist with breastfeeding concerns. Mom states baby has been latching well since birth. Mom denies any discomfort during feedings. Mom states no questions or concerns at this time. Assistance offered at next feeding and as requested.

## 2018-02-04 NOTE — PROGRESS NOTES
Late Entry  0800-2-3/60/-2, orders for pitocin.  Pitocin started at 0830 per pump.  1200-Unable to care for patient due to emergency c/s in S217.  Pt is currently on 11 of pitocin, and is comfortable and has not requested pain medication or epidural.  She states she will request when ready.

## 2018-02-04 NOTE — DELIVERY NOTE
DATE OF SERVICE:  2018    This is a 22-year-old  3, para 2 female at 39 weeks' gestation today,   was brought in for elective induction of labor as she had a favorable cervix.    GBS was noted to be negative.  She was admitted early this morning and   started on Pitocin.  She was 2-3 cm on admission, 60% effaced.  She progressed   nicely and got into a regular contraction pattern, ruptured spontaneously,   and did start having bloody show.  At that point, she was 4 cm, 80% effaced,   -1 station, and fetal heart tracing was category 1.  She received an epidural   for pain control.  Within an hour and a half, she was 8 cm, continued to have   a moderate amount of bloody show and clot, felt like there was a forebag still   in place.  This was ruptured.  Over the next hour and a half to 2 hours, she   continued to labor.  She reached the anterior lip, which was very stretchy and   reducible.  She was set up for pushing.  Fetal heart tracing continued to be   category 1.  She pushed over 2 contractions to go on to deliver a male infant   from OA presentation.  Delivery of the head was manual assisted.  There was   definitely blood and clot that came out with the baby.  There was a loose   nuchal cord that easily reduced over the baby's shoulders.  Baby was placed on   mom's abdomen.  Mouth and nares were bulb suctioned.  Baby had a vigorous cry   and excellent tone.  Cord was doubly clamped and cut after it was done   pulsating.  Placenta delivered spontaneously.  There was definitely an area of   clot on the side, would suspect at least a partial abruption at 20% of the   placenta.  Patient was started on IV Pitocin just as the placenta was coming   out and she had excellent tone of the fundus.  She did continue to have some   bleeding and passing clots.  I have placed Cytotec.  Perineum and vagina were   noted to be intact by the way and felt that on bimanual massage the fundus was   firm, but the lower  uterine segment was atonic.  I did inject Pitocin   directly into the lower uterine segment.  I also gave her 1 dose of Methergine   within a couple of minutes of bimanual massage.  Lower uterine segment seemed   to clamp down nicely and bleeding decreased.  Patient remained stable   throughout all of this.    Total EBL for the delivery is 600 mL.  Baby is doing well.  Apgars were 8 and   9.  Weight is pending.  Sponge and needle counts were correct.       ____________________________________     MD RAHEL Villeda / MORENO    DD:  02/03/2018 17:23:59  DT:  02/03/2018 17:56:09    D#:  [id number]  Job#:  [id number]

## 2018-02-05 VITALS
SYSTOLIC BLOOD PRESSURE: 115 MMHG | DIASTOLIC BLOOD PRESSURE: 73 MMHG | BODY MASS INDEX: 29.77 KG/M2 | WEIGHT: 201 LBS | HEART RATE: 65 BPM | OXYGEN SATURATION: 97 % | RESPIRATION RATE: 18 BRPM | HEIGHT: 69 IN | TEMPERATURE: 97.7 F

## 2018-02-05 PROCEDURE — 700102 HCHG RX REV CODE 250 W/ 637 OVERRIDE(OP): Performed by: OBSTETRICS & GYNECOLOGY

## 2018-02-05 PROCEDURE — A9270 NON-COVERED ITEM OR SERVICE: HCPCS | Performed by: OBSTETRICS & GYNECOLOGY

## 2018-02-05 RX ORDER — IBUPROFEN 600 MG/1
600 TABLET ORAL EVERY 6 HOURS PRN
Qty: 30 TAB | Refills: 1 | Status: SHIPPED | OUTPATIENT
Start: 2018-02-05 | End: 2019-11-01

## 2018-02-05 RX ADMIN — IBUPROFEN 600 MG: 600 TABLET, FILM COATED ORAL at 01:50

## 2018-02-05 ASSESSMENT — LIFESTYLE VARIABLES: EVER_SMOKED: NEVER

## 2018-02-05 ASSESSMENT — PAIN SCALES - GENERAL
PAINLEVEL_OUTOF10: 4
PAINLEVEL_OUTOF10: 2
PAINLEVEL_OUTOF10: 2

## 2018-02-05 NOTE — PROGRESS NOTES
Discharge teaching reviewed with patient, all questions answered. Pt given all written information on self and infant care, including prescriptions and follow-up instructions. Instructed patient on abdominal binder.

## 2018-02-05 NOTE — PROGRESS NOTES
Assessment done vital signs stable. Patient progressing according to plan of care. Fundus firm at the umbilicus with light lochia. Patient up voiding without difficulty. Ambulating with steady gait.  Breast feeding infant on demand. Family at bedside. Patient denies problems at this time. Patient encouraged to call for any needs. Will continue to monitor. Pt claims she will call when medications are needed

## 2018-02-05 NOTE — PROGRESS NOTES
Follow up visit. Per DALIA Flower, patient asked for a bottle. She told RN she only plans to breastfeed for a short period and prefers bottle. Discussed with [facility] if she knows of resources available to her after discharge. She is established with [facility] on [address]. Gave her number to [facility] and invited her to join at Breastfeeding forums.     Encouraged to call for support at needed.

## 2018-02-05 NOTE — CARE PLAN
Problem: Altered physiologic condition related to immediate post-delivery state and potential for bleeding/hemorrhage  Goal: Patient physiologically stable as evidenced by normal lochia, palpable uterine involution and vital signs within normal limits  Outcome: PROGRESSING AS EXPECTED  FF@U with light lochia    Problem: Potential for postpartum infection related to presence of episiotomy/vaginal tear and/or uterine contamination  Goal: Patient will be absent from signs and symptoms of infection  Outcome: PROGRESSING AS EXPECTED  No signs of infection at time of assessment

## 2018-02-05 NOTE — DISCHARGE SUMMARY
Discharge Summary:      Tiara Martin      Admit Date:   2/3/2018  Discharge Date:  2/5/2018     Admitting diagnosis:  Pregnancy  IOL  Indication for care in labor or delivery  Discharge Diagnosis: Status post vaginal, spontaneous.  Pregnancy Complications: none  Tubal Ligation:  no        History:  History reviewed. No pertinent past medical history.  OB History   No data available        Patient has no known allergies.  There are no active problems to display for this patient.       Hospital Course:   22 y.o. No obstetric history on file., now para 3, was admitted with the above mentioned diagnosis, underwent Induction of Labor, vaginal, spontaneous. Patient postpartum course was unremarkable, with progressive advancement in diet , ambulation and toleration of oral analgesia. Patient without complaints today and desires discharge.      Vitals:    02/03/18 2008 02/04/18 0000 02/04/18 0400 02/04/18 0800   BP: 117/75 128/75 114/61 105/59   Pulse: 84 74 63 68   Resp: 18 18 18 16   Temp: 36.8 °C (98.2 °F) 37.2 °C (98.9 °F) 36.3 °C (97.4 °F) 36.6 °C (97.8 °F)   SpO2: 98% 96% 99% 96%   Weight:       Height:           Current Facility-Administered Medications   Medication Dose   • oxytocin (PITOCIN) infusion (for postpartum)   mL/hr   • acetaminophen (TYLENOL) tablet 650 mg  650 mg   • ibuprofen (MOTRIN) tablet 600 mg  600 mg   • ondansetron (ZOFRAN) syringe/vial injection 4 mg  4 mg   • LR infusion     • PRN oxytocin (PITOCIN) (20 Units/1000 mL) PRN for excessive uterine bleeding - See Admin Instr  125-999 mL/hr   • methylergonovine (METHERGINE) injection 0.2 mg  0.2 mg   • docusate sodium (COLACE) capsule 100 mg  100 mg   • oxyCODONE-acetaminophen (PERCOCET) 5-325 MG per tablet 1-2 Tab  1-2 Tab       Exam:  Breast Exam: negative  Abdomen: Abdomen soft, non-tender. BS normal. No masses,  No organomegaly  Fundus Non Tender: yes  Incision: none  Perineum: perineum intact  Extremity: extremities, peripheral  pulses and reflexes normal     Labs:  Recent Labs      02/03/18   0715  02/04/18   0529   WBC  6.4  8.9   RBC  4.41  4.03*   HEMOGLOBIN  9.3*  8.2*   HEMATOCRIT  30.0*  26.9*   MCV  68.0*  66.7*   MCH  21.1*  20.3*   MCHC  31.0*  30.5*   RDW  40.6  40.1   PLATELETCT  142*  114*        Activity:   Discharge to home  Pelvic Rest x 6 weeks    Assessment:  normal postpartum course  PP anemia; acute on chronic   Discharge Assessment: Meeting all discharge criteria          Discharge Meds:   Current Outpatient Prescriptions   Medication Sig Dispense Refill   • ibuprofen (MOTRIN) 600 MG Tab Take 1 Tab by mouth every 6 hours as needed (For cramping after delivery; do not give if patient is receiving ketorolac (Toradol)). 30 Tab 1       Cecilia Barros M.D.

## 2018-02-05 NOTE — PROGRESS NOTES
Assessment complete. VSS- Fundus firm, lochia light. Patient ambulating and voiding without difficulty. POC discussed at bedside. Feeding plan discussed; pt will call for next feed. Patient would like to call if she would like pain medications throughout the night. Call light within reach.

## 2018-02-05 NOTE — DISCHARGE INSTRUCTIONS
POSTPARTUM DISCHARGE INSTRUCTIONS FOR MOM    YOB: 1995   Age: 22 y.o.               Admit Date: 2/3/2018     Discharge Date: 2018  Attending Doctor:  Cecilia Barros M.D.                  Allergies:  Patient has no known allergies.    Discharged to home by car. Discharged via wheelchair, hospital escort: Yes.  Special equipment needed: Not Applicable  Belongings with: Personal  Be sure to schedule a follow-up appointment with your primary care doctor or any specialists as instructed.     Discharge Plan:   Diet Plan: Discussed  Activity Level: Discussed  Confirmed Follow up Appointment: Patient to Call and Schedule Appointment  Influenza Vaccine Indication: Patient Refuses    REASONS TO CALL YOUR OBSTETRICIAN:  1.   Persistent fever or shaking chills (Temperature higher than 100.4)  2.   Heavy bleeding (soaking more than 1 pad per hour); Passing clots  3.   Foul odor from vagina  4.   Mastitis (Breast infection; breast pain, chills, fever, redness)  5.   Urinary pain, burning or frequency  6.   Episiotomy infection  7.   Abdominal incision infection  8.   Severe depression longer than 24 hours    HAND WASHING  · Prior to handling the baby.  · Before breastfeeding or bottle feeding baby.  · After using the bathroom or changing the baby's diaper.    WOUND CARE  Ask your physician for additional care instructions.  In general:    ·  Incision:      · Keep clean and dry.    · Do NOT lift anything heavier than your baby for up to 6 weeks.    · There should not be any opening or pus.      VAGINAL CARE  · Nothing inside vagina for 6 weeks: no sexual intercourse, tampons or douching.  · Bleeding may continue for 2-4 weeks.  Amount may vary.    · Call your physician for heavy bleeding which means soaking more than 1 pad per hour    BIRTH CONTROL  · It is possible to become pregnant at any time after delivery and while breastfeeding.  · Plan to discuss a method of birth control with your physician at  "your follow up visit. visit.    DIET AND ELIMINATION  · Eating more fiber (bran cereal, fruits, and vegetables) and drinking plenty of fluids will help to avoid constipation.  · Urinary frequency after childbirth is normal.    POSTPARTUM BLUES  During the first few days after birth, you may experience a sense of the \"blues\" which may include impatience, irritability or even crying.  These feeling come and go quickly.  However, as many as 1 in 10 women experience emotional symptoms known as postpartum depression.    Postpartum depression:  May start as early as the second or third day after delivery or take several weeks or months to develop.  Symptoms of \"blues\" are present, but are more intense:  Crying spells; loss of appetite; feelings of hopelessness or loss of control; fear of touching the baby; over concern or no concern at all about the baby; little or no concern about your own appearance/caring for yourself; and/or inability to sleep or excessive sleeping.  Contact your physician if you are experiencing any of these symptoms.    Crisis Hotline:  · [place] Crisis Hotline:  1-215-MZQOJBM  Or 1-827.389.7705  · [place] Crisis Hotline:  1-430.480.6666  Or 743-772-5043    PREVENTING SHAKEN BABY:  If you are angry or stressed, PUT THE BABY IN THE CRIB, step away, take some deep breaths, and wait until you are calm to care for the baby.  DO NOT SHAKE THE BABY.  You are not alone, call a supporter for help.    · Crisis Call Center 24/7 crisis line 878-306-9391 or 1-565.849.9293  · You can also text them, text \"ANSWER\" to 497441    QUIT SMOKING/TOBACCO USE:  I understand the use of any tobacco products increases my chance of suffering from future heart disease and could cause other illnesses which may shorten my life. Quitting the use of tobacco products is the single most important thing I can do to improve my health. For further information on smoking / tobacco cessation call a Toll Free Quit Line at 1-433.392.9796 " (*National Cancer [place]) or 1-436.802.4546 (American Lung Association) or you can access the web based program at www.lungusa.org.    · [place] Tobacco Users Help Line:  (467) 846-2738       Toll Free: 1-260.106.8579  · Quit Tobacco Program [facility] (521)511-4144    DEPRESSION / SUICIDE RISK:  As you are discharged from this [facility], it is important to learn how to keep safe from harming yourself.    Recognize the warning signs:  · Abrupt changes in personality, positive or negative- including increase in energy   · Giving away possessions  · Change in eating patterns- significant weight changes-  positive or negative  · Change in sleeping patterns- unable to sleep or sleeping all the time   · Unwillingness or inability to communicate  · Depression  · Unusual sadness, discouragement and loneliness  · Talk of wanting to die  · Neglect of personal appearance   · Rebelliousness- reckless behavior  · Withdrawal from people/activities they love  · Confusion- inability to concentrate     If you or a loved one observes any of these behaviors or has concerns about self-harm, here's what you can do:  · Talk about it- your feelings and reasons for harming yourself  · Remove any means that you might use to hurt yourself (examples: pills, rope, extension cords, firearm)  · Get professional help from the community (Mental Health, Substance Abuse, psychological counseling)  · Do not be alone:Call your Safe Contact- someone whom you trust who will be there for you.  · Call your local CRISIS HOTLINE 178-4355 or 962-726-8571  · Call your local Children's Mobile Crisis Response Team [place] (597) 649-1676 or www.[employer]  · Call the toll free National Suicide Prevention Hotlines   · National Suicide Prevention Lifeline 575-456-MYKA (5496)  · National Hope Line Network 800-SUICIDE (082-6754)    DISCHARGE SURVEY:  Thank you for choosing [facility].  We hope we provided you  with very good care.  You may be receiving a survey in the mail.  Please fill it out.  Your opinion is valuable to us.    ADDITIONAL EDUCATIONAL MATERIALS GIVEN TO PATIENT:        My signature on this form indicates that:  1.  I have reviewed and understand the above information  2.  My questions regarding this information have been answered to my satisfaction.  3.  I have formulated a plan with my discharge nurse to obtain my prescribed medication for home.  POSTPARTUM DISCHARGE INSTRUCTIONS FOR MOM    YOB: 1995   Age: 22 y.o.               Admit Date: 2/3/2018     Discharge Date: 2018  Attending Doctor:  Cecilia Barros M.D.                  Allergies:  Patient has no known allergies.    Discharged to home by car. Discharged via wheelchair, hospital escort: Yes.  Special equipment needed: Not Applicable  Belongings with: Personal  Be sure to schedule a follow-up appointment with your primary care doctor or any specialists as instructed.     Discharge Plan:   Influenza Vaccine Indication: Patient Refuses    REASONS TO CALL YOUR OBSTETRICIAN:  1.   Persistent fever or shaking chills (Temperature higher than 100.4)  2.   Heavy bleeding (soaking more than 1 pad per hour); Passing clots  3.   Foul odor from vagina  4.   Mastitis (Breast infection; breast pain, chills, fever, redness)  5.   Urinary pain, burning or frequency  6.   Episiotomy infection  7.   Abdominal incision infection  8.   Severe depression longer than 24 hours    HAND WASHING  · Prior to handling the baby.  · Before breastfeeding or bottle feeding baby.  · After using the bathroom or changing the baby's diaper.    WOUND CARE  Ask your physician for additional care instructions.  In general:    ·  Incision:      · Keep clean and dry.    · Do NOT lift anything heavier than your baby for up to 6 weeks.    · There should not be any opening or pus.      VAGINAL CARE  · Nothing inside vagina for 6 weeks: no sexual intercourse,  "tampons or douching.  · Bleeding may continue for 2-4 weeks.  Amount may vary.    · Call your physician for heavy bleeding which means soaking more than 1 pad per hour    BIRTH CONTROL  · It is possible to become pregnant at any time after delivery and while breastfeeding.  · Plan to discuss a method of birth control with your physician at your follow up visit. visit.    DIET AND ELIMINATION  · Eating more fiber (bran cereal, fruits, and vegetables) and drinking plenty of fluids will help to avoid constipation.  · Urinary frequency after childbirth is normal.    POSTPARTUM BLUES  During the first few days after birth, you may experience a sense of the \"blues\" which may include impatience, irritability or even crying.  These feeling come and go quickly.  However, as many as 1 in 10 women experience emotional symptoms known as postpartum depression.    Postpartum depression:  May start as early as the second or third day after delivery or take several weeks or months to develop.  Symptoms of \"blues\" are present, but are more intense:  Crying spells; loss of appetite; feelings of hopelessness or loss of control; fear of touching the baby; over concern or no concern at all about the baby; little or no concern about your own appearance/caring for yourself; and/or inability to sleep or excessive sleeping.  Contact your physician if you are experiencing any of these symptoms.    Crisis Hotline:  · [place] Crisis Hotline:  1-852-AAAEEQI  Or 1-631.577.6530  · [place] Crisis Hotline:  1-312.103.4238  Or 594-177-9226    PREVENTING SHAKEN BABY:  If you are angry or stressed, PUT THE BABY IN THE CRIB, step away, take some deep breaths, and wait until you are calm to care for the baby.  DO NOT SHAKE THE BABY.  You are not alone, call a supporter for help.    · Crisis Call Center 24/7 crisis line 338-082-4969 or 1-122.290.6050  · You can also text them, text \"ANSWER\" to 120064    QUIT SMOKING/TOBACCO USE:  I understand the use of " any tobacco products increases my chance of suffering from future heart disease and could cause other illnesses which may shorten my life. Quitting the use of tobacco products is the single most important thing I can do to improve my health. For further information on smoking / tobacco cessation call a Toll Free Quit Line at 1-536.795.3289 (*National Cancer [place]) or 1-933.622.2504 (American Lung Association) or you can access the web based program at www.lungusa.org.    · [place] Tobacco Users Help Line:  (437) 368-3836       Toll Free: 1-904.695.8951  · Quit Tobacco Program [facility] (451)829-9396    DEPRESSION / SUICIDE RISK:  As you are discharged from this [facility], it is important to learn how to keep safe from harming yourself.    Recognize the warning signs:  · Abrupt changes in personality, positive or negative- including increase in energy   · Giving away possessions  · Change in eating patterns- significant weight changes-  positive or negative  · Change in sleeping patterns- unable to sleep or sleeping all the time   · Unwillingness or inability to communicate  · Depression  · Unusual sadness, discouragement and loneliness  · Talk of wanting to die  · Neglect of personal appearance   · Rebelliousness- reckless behavior  · Withdrawal from people/activities they love  · Confusion- inability to concentrate     If you or a loved one observes any of these behaviors or has concerns about self-harm, here's what you can do:  · Talk about it- your feelings and reasons for harming yourself  · Remove any means that you might use to hurt yourself (examples: pills, rope, extension cords, firearm)  · Get professional help from the community (Mental Health, Substance Abuse, psychological counseling)  · Do not be alone:Call your Safe Contact- someone whom you trust who will be there for you.  · Call your local CRISIS HOTLINE 323-5019 or 495-123-7008  · Call your local Children's  Mobile Crisis Response Team [place] (775) 517-4422 or www.[employer]  · Call the toll free National Suicide Prevention Hotlines   · National Suicide Prevention Lifeline 657-067-LUAC (1523)  · National Hope Line Network 800-SUICIDE (665-6241)    DISCHARGE SURVEY:  Thank you for choosing [facility].  We hope we provided you with very good care.  You may be receiving a survey in the mail.  Please fill it out.  Your opinion is valuable to us.    ADDITIONAL EDUCATIONAL MATERIALS GIVEN TO PATIENT:        My signature on this form indicates that:  1.  I have reviewed and understand the above information  2.  My questions regarding this information have been answered to my satisfaction.  3.  I have formulated a plan with my discharge nurse to obtain my prescribed medication for home.

## 2018-02-05 NOTE — PROGRESS NOTES
Abdominal binder was delivered to patient.  If any further assistance needed, please call extension [phone] or place order for Ortho Technician assistance as a communication order in [employer].

## 2019-03-25 ENCOUNTER — HOSPITAL ENCOUNTER (EMERGENCY)
Dept: HOSPITAL 8 - ED | Age: 24
Discharge: HOME | End: 2019-03-25
Payer: COMMERCIAL

## 2019-03-25 VITALS — BODY MASS INDEX: 24.72 KG/M2 | WEIGHT: 166.89 LBS | HEIGHT: 69 IN

## 2019-03-25 VITALS — DIASTOLIC BLOOD PRESSURE: 74 MMHG | SYSTOLIC BLOOD PRESSURE: 114 MMHG
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
## 2019-03-25 DIAGNOSIS — G43.909: ICD-10-CM

## 2019-03-25 DIAGNOSIS — S29.012A: Primary | ICD-10-CM
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
## 2019-03-25 LAB — HCG UR SG: 1.01 (ref 1–1.03)

## 2019-03-25 PROCEDURE — 99284 EMERGENCY DEPT VISIT MOD MDM: CPT

## 2019-03-25 PROCEDURE — 72072 X-RAY EXAM THORAC SPINE 3VWS: CPT

## 2019-03-25 PROCEDURE — 81025 URINE PREGNANCY TEST: CPT

## 2019-06-21 ENCOUNTER — GYNECOLOGY VISIT (OUTPATIENT)
Dept: OBGYN | Facility: CLINIC | Age: 24
End: 2019-06-21
Payer: MEDICAID

## 2019-06-21 VITALS
SYSTOLIC BLOOD PRESSURE: 120 MMHG | DIASTOLIC BLOOD PRESSURE: 64 MMHG | WEIGHT: 163 LBS | HEIGHT: 69 IN | BODY MASS INDEX: 24.14 KG/M2

## 2019-06-21 DIAGNOSIS — N91.2 AMENORRHEA: ICD-10-CM

## 2019-06-21 DIAGNOSIS — Z32.01 PREGNANCY EXAMINATION OR TEST, POSITIVE RESULT: ICD-10-CM

## 2019-06-21 LAB
INT CON NEG: NEGATIVE
INT CON POS: POSITIVE
POC URINE PREGNANCY TEST: POSITIVE

## 2019-06-21 PROCEDURE — 99203 OFFICE O/P NEW LOW 30 MIN: CPT | Mod: 25 | Performed by: OBSTETRICS & GYNECOLOGY

## 2019-06-21 PROCEDURE — 81025 URINE PREGNANCY TEST: CPT | Performed by: OBSTETRICS & GYNECOLOGY

## 2019-06-21 PROCEDURE — 76830 TRANSVAGINAL US NON-OB: CPT | Performed by: OBSTETRICS & GYNECOLOGY

## 2019-06-21 NOTE — PROGRESS NOTES
Cc: Confirmation of pregnancy    HPI:  The patient is a 23 y.o.  Unknown based upon  Patient's last menstrual period was 2019..  Patient is 8 weeks and 1 days pregnant with due date of 2020 by last menstrual period.    Patient having some left ovarian pain as well as some nausea and vomiting and cramping    She presents for a confirmation of pregnancy.  She denies  fetal movement,  denies  vaginal bleeding,  denies  leakage of fluid,  denies contractions.   She reports nausea/vomiting, denies headache, and denies dysuria.      Review of systems:  Pertinent positives documented in HPI and all other systems reviewed & are negative    OB History    Para Term  AB Living   3 3 3     3   SAB TAB Ectopic Molar Multiple Live Births             3      # Outcome Date GA Lbr Ervin/2nd Weight Sex Delivery Anes PTL Lv   3 Term 18 39w0d  2.977 kg (6 lb 9 oz) M Vag-Spont EPI N RAKESH      Birth Comments: Pt states was told bottom of her uterus wasn't closing.    2 Term 16 39w0d  2.92 kg (6 lb 7 oz) M Vag-Spont EPI N RAKESH      Birth Comments: Pt states no complications.    1 Term 14 39w0d  2.977 kg (6 lb 9 oz) F Vag-Spont EPI N RAKESH      Birth Comments: Pt states no complications.         Past Medical History:   Diagnosis Date   • Head ache      History reviewed. No pertinent surgical history.  Social History     Social History   • Marital status: Single     Spouse name: N/A   • Number of children: N/A   • Years of education: N/A     Occupational History   • Not on file.     Social History Main Topics   • Smoking status: Never Smoker   • Smokeless tobacco: Never Used   • Alcohol use No   • Drug use: No   • Sexual activity: Yes     Partners: Male      Comment: none     Other Topics Concern   • Not on file     Social History Narrative   • No narrative on file     Family History   Problem Relation Age of Onset   • No Known Problems Mother    • Diabetes Father    • Cancer Maternal  "Grandfather    • Obesity Paternal Grandfather    • No Known Problems Sister    • No Known Problems Brother      Allergies:   Allergies as of 06/21/2019   • (No Known Allergies)       PE:    /64   Ht 1.753 m (5' 9\")   Wt 73.9 kg (163 lb)       General:appears stated age, is in no apparent distress, is well developed and well nourished  Head: normocephalic, non-tender  Neck: neck is supple  Abdomen: Bowel sounds positive, nondistended, soft, nontender x4, no rebound or guarding. No organomegaly. No masses.  Female GYN: normal female external genitalia without lesionsnormal external genitalia, no erythema, no discharge, no vaginal discharge, normal vagina and normal vaginal tone, normal cervix, normal uterus, size and consistency, normal adnexa without tenderness  Skin: No rashes, or ulcers or lesions seen  Psychiatric: Patient shows appropriate affect, is alert and oriented x3, intact judgment and insight.    Transvaginal US performed and per my read:    Indication: Dating.     Findings: kenny intrauterine pregnancy @6 weeks and 2 days weeks by CRL. Positive yolk sac. Positive fetal cardiac activity @120 BPM. Right ovary normal. Left Ovary normal. Cervical length 3.7 cm. No free fluid in the cul-de-sac.    Impression: viable IUP @6 weeks and 2 days. EDC by US of February 12, 2020    Given discrepancy between last menstrual period and ultrasound today, due date has been changed February 12, 2020      A/P:   1. Pregnancy examination or test, positive result  POCT Pregnancy       1. Spent 15 minutes in face-to-face patient contact in which greater than 50% of that visit was spent in counseling/coordination of care of newly diagnosed pregnancy including medical and surgical options of care.  2. 1st trimester screening for Down Syndrome and neural tube defects will be discussed at new OB visit  3.  SAB precautions discussed  4.  F/u in 4 weeks for new OB visit  5.  Increase water intake and encouraged healthy " nutrition.  6.  Begin prenatal vitamins.    Left ovary normal on ultrasound.  No evidence of any abnormality.  Precautions discussed with patient    Will need prenatal labs and Pap with cervical cultures at new OB visit    All questions answered

## 2019-06-21 NOTE — PROGRESS NOTES
Pt here for Gyn visit for left ovarian pain, pregnancy test   # [phone]  Pt states having left ovary pain, nausea, vomiting and cramping denies bleeding.   + pregnancy test in clinic today  LMP 4/25/19  GA 8w1d  DAVDI 1/30/2020

## 2019-09-09 ENCOUNTER — INITIAL PRENATAL (OUTPATIENT)
Dept: OBGYN | Facility: CLINIC | Age: 24
End: 2019-09-09
Payer: MEDICAID

## 2019-09-09 VITALS
BODY MASS INDEX: 24.73 KG/M2 | WEIGHT: 167 LBS | DIASTOLIC BLOOD PRESSURE: 62 MMHG | SYSTOLIC BLOOD PRESSURE: 100 MMHG | HEIGHT: 69 IN

## 2019-09-09 DIAGNOSIS — Z34.80 SUPERVISION OF OTHER NORMAL PREGNANCY: ICD-10-CM

## 2019-09-09 DIAGNOSIS — Z32.01 PREGNANCY EXAMINATION OR TEST, POSITIVE RESULT: ICD-10-CM

## 2019-09-09 LAB
APPEARANCE UR: NORMAL
BILIRUB UR STRIP-MCNC: NORMAL MG/DL
COLOR UR AUTO: NORMAL
GLUCOSE UR STRIP.AUTO-MCNC: NEGATIVE MG/DL
KETONES UR STRIP.AUTO-MCNC: NEGATIVE MG/DL
LEUKOCYTE ESTERASE UR QL STRIP.AUTO: NORMAL
NITRITE UR QL STRIP.AUTO: NEGATIVE
PH UR STRIP.AUTO: 8 [PH] (ref 5–8)
PROT UR QL STRIP: NEGATIVE MG/DL
RBC UR QL AUTO: NEGATIVE
SP GR UR STRIP.AUTO: 1.01
UROBILINOGEN UR STRIP-MCNC: NORMAL MG/DL

## 2019-09-09 PROCEDURE — 81002 URINALYSIS NONAUTO W/O SCOPE: CPT | Performed by: NURSE PRACTITIONER

## 2019-09-09 PROCEDURE — 0500F INITIAL PRENATAL CARE VISIT: CPT | Performed by: NURSE PRACTITIONER

## 2019-09-09 NOTE — PROGRESS NOTES
Pt. Here for NOB visit today.  #  [phone]  First prenatal care  Pt. States she has been experiencing headaches   Pharmacy verified  Pt declines CF, consent signed.  Pt declines AFP, consent signed   Chaperone offered and not indicated

## 2019-09-09 NOTE — PROGRESS NOTES
Subjective:   Tiara Martin is a 23 y.o.  who presents for her new OB exam.  She is 17w5d with an DAVID of Estimated Date of Delivery: 20 by  at 6 weeks. She is feeling well and has no concerns at this time. Denies VB, LOF, contractions or pain. No ER visits or previous care in this pregnancy. Denies dysuria, vaginal DC, fever. Reports fetal movement. Declines AFP.  Declines CF.  Reports new onset frontal headaches that dont go away with tylenol because she vomits up medication from nausea from headache. Drinking about 3-4 four standard size water bottles a day.     Past Medical History:   Diagnosis Date   • Head ache        Psych Hx: Patient denies any history of depression, anxiety, PTSD, bipolar or any other psychological issues.     History reviewed. No pertinent surgical history.     OB History    Para Term  AB Living   4 3 3     3   SAB TAB Ectopic Molar Multiple Live Births             3      # Outcome Date GA Lbr Ervin/2nd Weight Sex Delivery Anes PTL Lv   4 Current            3 Term 18 39w0d  2.977 kg (6 lb 9 oz) M Vag-Spont EPI N RAKESH      Birth Comments: Pt states was told bottom of her uterus wasn't closing.    2 Term 16 39w0d  2.92 kg (6 lb 7 oz) M Vag-Spont EPI N RAKESH      Birth Comments: Pt states no complications.    1 Term 14 39w0d  2.977 kg (6 lb 9 oz) F Vag-Spont EPI N RAKESH      Birth Comments: Pt states no complications.         Gynecological Hx: Denies any hx of STIs, including HSV. Denies any vulvovaginal disorders and no hx of abnormal cervical cytology. Last pap normal [facility] around last year.     Sexual Hx: One current male partner, who is FOB     Contraceptive Hx: Has used pills in the past and has since discontinued use.     Family History   Problem Relation Age of Onset   • No Known Problems Mother    • Diabetes Father    • Cancer Maternal Grandfather    • Obesity Paternal Grandfather    • No Known Problems Sister    • No Known Problems  Brother      Denies any genetic disorders in family history.     Social History     Socioeconomic History   • Marital status: Single     Spouse name: Not on file   • Number of children: Not on file   • Years of education: Not on file   • Highest education level: Not on file   Occupational History   • Not on file   Social Needs   • Financial resource strain: Not on file   • Food insecurity:     Worry: Not on file     Inability: Not on file   • Transportation needs:     Medical: Not on file     Non-medical: Not on file   Tobacco Use   • Smoking status: Never Smoker   • Smokeless tobacco: Never Used   Substance and Sexual Activity   • Alcohol use: No   • Drug use: No   • Sexual activity: Yes     Partners: Male     Comment: None   Lifestyle   • Physical activity:     Days per week: Not on file     Minutes per session: Not on file   • Stress: Not on file   Relationships   • Social connections:     Talks on phone: Not on file     Gets together: Not on file     Attends Hoahaoism service: Not on file     Active member of club or organization: Not on file     Attends meetings of clubs or organizations: Not on file     Relationship status: Not on file   • Intimate partner violence:     Fear of current or ex partner: Not on file     Emotionally abused: Not on file     Physically abused: Not on file     Forced sexual activity: Not on file   Other Topics Concern   • Not on file   Social History Narrative   • Not on file       FOB is involved and does not lives with Tiara Martin.  She lives with her kids. Pregnancy is unplanned but desired.    She is currently working at [employer], denies any heavy lifting or exposure to potential teratogens like environmental or occupational toxins.   Denies alcohol use, drug use, or tobacco use in pregnancy.   Denies any current or hx of sexual, emotional or physical abuse or trauma.     Current Medications: PNV   Allergies: Denies allergies to medications, food, or  "environmental allergies    Objective:      Vitals:    09/09/19 0851   BP: 100/62   Weight: 75.8 kg (167 lb)   Height: 1.753 m (5' 9\")        See Prenatal Physical and Prenatal Vitals  UA WNL today      Assessment:      1.  IUP @ 17w5d per       2.  S=D      3.  See problem list as follows     There are no active problems to display for this patient.        Plan:   - GC/CT & pap done today   - Declines AFP  - Reviewed protein intake, water intake, extra strength tylenol and caffeine for headaches and to advise if not improving   - Prenatal labs ordered - lab slip given  - Discussed PNV, nutrition, adequate water intake, and exercise/weight gain in pregnancy  - NOB informational packet with anticipatory guidance given  - Information on Centering Pregnancy given, pt not appropriate  - S/sx of pregnancy warning signs and PTL precautions given  - Complete OB US in 3 wks  - Return to [facility] in 4 wks  "

## 2019-09-09 NOTE — LETTER
Cystic Fibrosis Carrier Testing  Tiara Martin    The following information is about a blood test that can be done to determine if you and/or your partner carry the gene for cystic fibrosis.    WHAT IS CYSTIC FIBROSIS?  · Cystic fibrosis (CF) is an inherited disease that affects more than 25,000 [place] children and young adults.  · Symptoms of CF vary but include lung congestion, pneumonia, diarrhea and poor growth.  Most people with CF have severe medical problems and some die at a young age.  Others have so few symptoms they are unaware they have CF.  · CF does not affect intelligence.  · Although there is no cure for CF at this time, scientists are making progress in improving treatment and in searching for a cure.  In the past many people with CF  at a very young age.  Today, many are living into their 20’s and 30’s.    IS THERE A CHANCE MY BABY COULD HAVE CYSTIC FIBROSIS?  · You can have a child with CF even if there is no history in your family (see chart below).  · CF testing can help determine if you are a carrier and at risk to have a child with CF.  Note: if both parents are carriers, there is a 1 in 4 (25%) chance with each pregnancy that they will have a child with CF.  · Carriers have one normal CF gene and one altered CF gene.  · People with CF have two altered CF genes.  · Most people have two normal copies of the CF gene.    Approximate risk that a couple with no family history of cystic fibrosis will have a child with cystic fibrosis:    Ethnic background / Risk     couple:  1 in 2,500   couple:  1 in 15,000            couple:  1 in 8,000     American couple:  1 in 32,000     WHAT TESTING IS AVAILABLE?  · There is a blood test that can be done to find out if you or your partner is a carrier.  · It is important to understand that CF carrier testing does not detect all CF carriers.  · If the test shows that you are both CF carriers, you unborn baby  can be tested to find out if the baby has CF.    HOW MUCH DOES IT COST TO HAVE CYSTIC FIBROSIS CARRIER TESTING?  · Cost and insurance coverage for CF carrier testing vary depending upon the laboratory used and your insurance policy.  · The average cost for CF carrier testing is $300 per person.  · Your genetic counselor can provide you with more information about cystic fibrosis carrier testing.    _____  Yes, I am interested in discussing carrier testing with a genetic counselor.    _____  No, I am not interested in CF carrier testing or in receiving more information about CF carrier testing.      Client signature: ________________________________________  9/9/2019

## 2019-09-17 ENCOUNTER — TELEPHONE (OUTPATIENT)
Dept: OBGYN | Facility: CLINIC | Age: 24
End: 2019-09-17

## 2019-09-17 RX ORDER — AZITHROMYCIN 500 MG/1
1000 TABLET, FILM COATED ORAL DAILY
Qty: 1 TAB | Refills: 0 | Status: SHIPPED | OUTPATIENT
Start: 2019-09-17 | End: 2019-11-22

## 2019-09-18 NOTE — TELEPHONE ENCOUNTER
Pt notified regarding positive chlamydia and need to  Rx from her pharmacy and Advised pt make a note of the date she took medication because she needs to be retested 4 wks after she had taken meds. Advised for her partner to be treated with his PCP or [facility]. Pt verbalized understanding, will comply and had no further questions   [facility] form and lab results faxed to Caryn at [facility].

## 2019-09-19 ENCOUNTER — TELEPHONE (OUTPATIENT)
Dept: OBGYN | Facility: CLINIC | Age: 24
End: 2019-09-19

## 2019-09-19 NOTE — TELEPHONE ENCOUNTER
Pt called c/o nausea and some cramping after taking Azithromycin this morning.  Per consultation with Janel Dumont, it may be the medication because it is a bit strong, pt should hydrate and eat small frequent meals. Pt notified and SAB precautions given. Pt understood and had no other questions

## 2019-10-04 ENCOUNTER — APPOINTMENT (OUTPATIENT)
Dept: RADIOLOGY | Facility: IMAGING CENTER | Age: 24
End: 2019-10-04
Attending: NURSE PRACTITIONER
Payer: MEDICAID

## 2019-10-04 DIAGNOSIS — Z32.01 PREGNANCY EXAMINATION OR TEST, POSITIVE RESULT: ICD-10-CM

## 2019-10-04 PROCEDURE — 76805 OB US >/= 14 WKS SNGL FETUS: CPT | Performed by: OBSTETRICS & GYNECOLOGY

## 2019-10-06 ENCOUNTER — DATING (OUTPATIENT)
Dept: OBGYN | Facility: CLINIC | Age: 24
End: 2019-10-06

## 2019-10-11 PROBLEM — O98.819 CHLAMYDIA INFECTION AFFECTING PREGNANCY: Status: ACTIVE | Noted: 2019-10-11

## 2019-10-11 PROBLEM — A74.9 CHLAMYDIA INFECTION AFFECTING PREGNANCY: Status: ACTIVE | Noted: 2019-10-11

## 2019-11-01 ENCOUNTER — ROUTINE PRENATAL (OUTPATIENT)
Dept: OBGYN | Facility: CLINIC | Age: 24
End: 2019-11-01
Payer: MEDICAID

## 2019-11-01 VITALS — SYSTOLIC BLOOD PRESSURE: 112 MMHG | BODY MASS INDEX: 27.17 KG/M2 | DIASTOLIC BLOOD PRESSURE: 68 MMHG | WEIGHT: 184 LBS

## 2019-11-01 DIAGNOSIS — O98.812 CHLAMYDIA INFECTION AFFECTING PREGNANCY IN SECOND TRIMESTER: ICD-10-CM

## 2019-11-01 DIAGNOSIS — Z34.80 SUPERVISION OF OTHER NORMAL PREGNANCY: Primary | ICD-10-CM

## 2019-11-01 DIAGNOSIS — A74.9 CHLAMYDIA INFECTION AFFECTING PREGNANCY IN SECOND TRIMESTER: ICD-10-CM

## 2019-11-01 PROCEDURE — 90040 PR PRENATAL FOLLOW UP: CPT | Performed by: NURSE PRACTITIONER

## 2019-11-01 NOTE — PATIENT INSTRUCTIONS
P:  1. Questions answered.           2. Encouraged adequate water intake        3.   labor precautions reviewed.        4.  F/u  4 wks.        5.  Flu vaccine declined.        6.  1hr GTT ordered. Encouraged pt to complete w PNP.         7.  MADIE done.        8.  US results reviewed w pt.

## 2019-11-01 NOTE — PROGRESS NOTES
S:  Pt is  at 25w2d here for routine OB follow up.  No c/o today, reports she took her abx tx.  Reports good FM.  Denies VB, RUCs, LOF or vaginal DC.    O:  Please see above vitals.        FHTs: 150        Fundal ht: 25 cm.    Complete OB US  10/4/2019 2:03 PM    HISTORY/REASON FOR EXAM:  Evaluate fetal anatomy.    TECHNIQUE/EXAM DESCRIPTION: OB complete ultrasound.    COMPARISON:  None    FINDINGS:  Fetal Lie:  Vertex  LMP:  2019  Clinical DAVID by LMP:  2020    Placenta (Location):  Posterior  Placenta Previa: No  Placental ndGndrndanddndend:nd nd2nd Amniotic Fluid Volume:  SHEILA = 13.61 cm    Fetal Heart Rate:  143 bpm    Cervical Length:  .46 cm transabdominal    No maternal adnexal mass is identified.    Umbilical Artery S/D Ratio(s):  Not applicable    Fetal Anatomy  Lateral Ventricles     Seen  Cisterna Magna        Seen  Cerebellum              Seen  CSP             Seen  Orbits             Seen  Face/Lips                Seen  Cord Insertion         Seen  Placental CI         Seen  4 Chamber Heart     Seen  LVOT               Seen  RVOT              Seen  3 Vessel View     Seen  Stomach       Seen  Kidneys                   Seen  Urinary Bladder      Seen  Spine                       Seen  3 Vessel Cord          Seen  Both Upper Extremities    Seen  Both Lower Extremities    Seen  Diaphragm             Seen  Movement       Seen  Gender:  Likely female    Fetal Biometry  BPD    5.05 cm, 21 weeks 2 days  HC    19.45 cm, 21 weeks 5 days  AC    17.49 cm, 22 weeks 3 days  Femur Length    3.65 cm, 21 weeks 4 days  Humerus Length    3.44 cm, 21 weeks 5 days  Cerebellum Diameter   2.25 cm    EGA by this US:  21 weeks 4 days  DAVID by this US: 02/10/2020  DAVID by 1st US:  2020    Estimated Fetal Weight:  466 grams  EFW Percentile: 78.6%    Comments:      Impression       Single intrauterine pregnancy of an estimated gestational age of 21 weeks 4 days with an estimated date of delivery of 02/10/2020.    Fetal  survey within normal limits.             A:  IUP at 25w2d  Patient Active Problem List    Diagnosis Date Noted   • Chlamydia infection affecting pregnancy 10/11/2019   • Supervision of other normal pregnancy 2019       P:  1. Questions answered.           2. Encouraged adequate water intake        3.   labor precautions reviewed.        4.  F/u  4 wks.        5.  Flu vaccine declined.        6.  1hr GTT ordered. Encouraged pt to complete w PNP.         7.  MADIE done.        8.  US results reviewed w pt.    Chaperone offered and provided by Tigist Worrell MA.

## 2019-11-01 NOTE — PROGRESS NOTES
Pt here today for OB follow up  Pt states no complaints   Reports +FM  Good # [phone]  Pharmacy Confirmed.  Chaperone offered and declined.   Pt given 1 hr gtt and instructions.    Pt declined Flu.    MADIE today.

## 2019-11-08 DIAGNOSIS — Z34.80 SUPERVISION OF OTHER NORMAL PREGNANCY: ICD-10-CM

## 2019-11-11 LAB
ABO GROUP BLD: NORMAL
BLD GP AB SCN SERPL QL: NORMAL
HBV SURFACE AG SERPL QL IA: NORMAL
HIV 1+2 AB+HIV1 P24 AG SERPL QL IA: NORMAL
RH BLD: NORMAL
RUBV IGG SERPL IA-ACNC: NORMAL
TREPONEMA PALLIDUM IGG+IGM AB [PRESENCE] IN SERUM OR PLASMA BY IMMUNOASSAY: NORMAL

## 2019-11-14 DIAGNOSIS — Z34.80 SUPERVISION OF OTHER NORMAL PREGNANCY: ICD-10-CM

## 2019-11-14 DIAGNOSIS — Z32.01 PREGNANCY EXAMINATION OR TEST, POSITIVE RESULT: ICD-10-CM

## 2019-11-22 ENCOUNTER — ROUTINE PRENATAL (OUTPATIENT)
Dept: OBGYN | Facility: CLINIC | Age: 24
End: 2019-11-22
Payer: MEDICAID

## 2019-11-22 VITALS — SYSTOLIC BLOOD PRESSURE: 102 MMHG | WEIGHT: 189 LBS | DIASTOLIC BLOOD PRESSURE: 70 MMHG | BODY MASS INDEX: 27.91 KG/M2

## 2019-11-22 DIAGNOSIS — O09.293 HISTORY OF POSTPARTUM HEMORRHAGE, CURRENTLY PREGNANT IN THIRD TRIMESTER: ICD-10-CM

## 2019-11-22 DIAGNOSIS — Z87.59 HISTORY OF PLACENTA ABRUPTION: ICD-10-CM

## 2019-11-22 DIAGNOSIS — Z34.83 ENCOUNTER FOR SUPERVISION OF OTHER NORMAL PREGNANCY, THIRD TRIMESTER: ICD-10-CM

## 2019-11-22 PROCEDURE — 90040 PR PRENATAL FOLLOW UP: CPT | Performed by: ADVANCED PRACTICE MIDWIFE

## 2019-11-22 RX ORDER — FERROUS SULFATE 325(65) MG
325 TABLET ORAL DAILY
Qty: 30 TAB | Refills: 4 | Status: ON HOLD | OUTPATIENT
Start: 2019-11-22 | End: 2020-02-08

## 2019-11-22 NOTE — PROGRESS NOTES
SUBJECTIVE:  Pt is a 24 y.o.   at 28w2d  gestation. Presents today for follow-up prenatal care. Has not been seen in ER or L & D since last visit. Reports good  fetal movement. Denies leaking of fluid dysuria, headaches, or N/V at this time.  Patient does report cramping/contractions. Generally feels well today. Some julián dean at night a few times. She rests and showers and they generally go away. She is developing lumps inside the armpits that she thinks are breast development.     Labs done: O positive, Rubella Immune, HIV negative.     Hemoglobin 9.9      OBJECTIVE:   /70   Wt 85.7 kg (189 lb)   LMP 2019   BMI 27.91 kg/m²   Patients' weight gain, fluid intake and exercise level discussed.  Vitals, fundal height , fetal position, and FHR reviewed on flowsheet    Lab:No results found for this or any previous visit (from the past 336 hour(s)).    ASSESSMENT/ PLAN:   - IUP at 28w2d    - S = D   -   Patient Active Problem List    Diagnosis Date Noted   • Chlamydia infection affecting pregnancy 10/11/2019   • Supervision of other normal pregnancy 2019       Tdap: offered and educated patient on this today. Can obtain at next visit.  PP BCM: desires BTL, consent signed today. She is aware that she will have this done postpartum unless  indicated.  - I discussed with patient starting ferrous sulfate daily at this time and taking consistent PNV  - We reviewed her delivery history that includes postpartum hemorrhage due to lower uterine atony.   - S/sx pregnancy and labor warning signs vs general discomforts discussed  - Fetal movements and kick counts reviewed. Adequate hydration reinforced  - Anticipatory guidance provided.   - RTC in 2 weeks for routine prenatal care.

## 2019-11-22 NOTE — PROGRESS NOTES
Pt here today for OB follow up  Pt states having julián dean   Reports +FM   Good # [phone]  Pharmacy Confirmed.  Chaperone offered and provided.    Pt given KIRTI sheet with instructions.  Pt desires BTL consent formed signed.   Pt desires TDap vaccine @ next appt   Vaccine not available this visit.

## 2019-11-22 NOTE — LETTER
"Count Your Baby's Movements  Another step to a healthy delivery    Tiara Martin            Dept: [phone]    How Many Weeks Pregnant? 28w 2d    Date to Begin Countin2019              How to use this chart    One way for your physician to keep track of your baby's health is by knowing how often the baby moves (or \"kicks\") in your womb.  You can help your physician to do this by using this chart every day.    Every day, you should see how many hours it takes for your baby to move 10 times.  Start in the morning, as soon as you get up.    · First, write down the time your baby moves until you get to 10.  · Check off one box every time your baby moves until you get to 10.  · Write down the time you finished counting in the last column.  · Total how long it took to count up all 10 movements.  · Finally, fill in the box that shows how long this took.  After counting 10 movements, you no longer have to count any more that day.  The next morning, just start counting again as soon as you get up.    What should you call a \"movement\"?  It is hard to say, because it will feel different from one mother to another and from one pregnancy to the next.  The important thing is that you count the movements the same way throughout your pregnancy.  If you have more questions, you should ask your physician.    Count carefully every day!  SAMPLE:  Week 28    How many hours did it take to feel 10 movements?       Start  Time     1     2     3     4     5     6     7     8     9     10   Finish Time   Mon 8:20 ·  ·  ·  ·  ·  ·  ·  ·  ·  ·  11:40                  Sat               Sun                 IMPORTANT: You should contact your physician if it takes more than two hours for you to feel 10 movements.  Each morning, write down the time and start to count the movements of your baby.  Keep track by checking off one box every time you feel one movement.  When you " "have felt 10 \"kicks\", write down the time you finished counting in the last column.  Then fill in the   box (over the check juliana) for the number of hours it took.  Be sure to read the complete instructions on the previous page.            "

## 2019-12-06 ENCOUNTER — ROUTINE PRENATAL (OUTPATIENT)
Dept: OBGYN | Facility: CLINIC | Age: 24
End: 2019-12-06
Payer: MEDICAID

## 2019-12-06 VITALS — WEIGHT: 191 LBS | BODY MASS INDEX: 28.21 KG/M2 | SYSTOLIC BLOOD PRESSURE: 120 MMHG | DIASTOLIC BLOOD PRESSURE: 60 MMHG

## 2019-12-06 DIAGNOSIS — O99.013 ANEMIA AFFECTING PREGNANCY IN THIRD TRIMESTER: ICD-10-CM

## 2019-12-06 DIAGNOSIS — Z34.80 SUPERVISION OF OTHER NORMAL PREGNANCY: ICD-10-CM

## 2019-12-06 PROBLEM — O98.819 CHLAMYDIA INFECTION AFFECTING PREGNANCY: Status: RESOLVED | Noted: 2019-10-11 | Resolved: 2019-12-06

## 2019-12-06 PROBLEM — A74.9 CHLAMYDIA INFECTION AFFECTING PREGNANCY: Status: RESOLVED | Noted: 2019-10-11 | Resolved: 2019-12-06

## 2019-12-06 PROCEDURE — 90040 PR PRENATAL FOLLOW UP: CPT | Performed by: PHYSICIAN ASSISTANT

## 2019-12-06 NOTE — PROGRESS NOTES
OB follow up   + fetal movement.  No VB, LOF or UC's.  Phone # [phone]  Preferred pharmacy confirmed.  C/o coughing, nasal congestion, feeling dizzy.  Supposed to take Iron but she does not tolerate it.

## 2019-12-06 NOTE — PROGRESS NOTES
"Pt has no complaints with strong cramping, regular UCs, Vb, LOF, though pt has had incr pressure and occ \"julián Peralta\" UCs. +FM. PTL precautions stressed. Pt recently got URI over past week, was seen at urgent care and had neg testing for influenza. Pt has been trying to take PNV, iron supplements and cold medicines, but she throws up everything when she takes meds. Pt to try natural remedies like steaming and neti pot prn. Call if worsening. Also, pt to try getting iron rich foods in diet. PNL, 1hr GTT, GC/CT MADIE wnl - pt notified of results. Unable to get TDaP due to URI today - will offer next visit. D/w pt need for scheduling IOL at 39wk due to hx of PPH, though d/w pt risk of hemorrhage with IOLs. RTC 2 wk or sooner prn.   "

## 2019-12-11 ENCOUNTER — TELEPHONE (OUTPATIENT)
Dept: OBGYN | Facility: CLINIC | Age: 24
End: 2019-12-11

## 2019-12-11 NOTE — TELEPHONE ENCOUNTER
Pt called c/o gush of fluid just now, pt denies vaginal bleeding, UC and states she had FM prior to gush of fluid. Adv pt to go to L&D for further evaluation. Pt understood and will comply. Pt had no other questions or concerns

## 2019-12-12 ENCOUNTER — HOSPITAL ENCOUNTER (OUTPATIENT)
Facility: MEDICAL CENTER | Age: 24
End: 2019-12-12
Attending: OBSTETRICS & GYNECOLOGY | Admitting: OBSTETRICS & GYNECOLOGY
Payer: MEDICAID

## 2019-12-12 VITALS
BODY MASS INDEX: 28.14 KG/M2 | HEIGHT: 69 IN | SYSTOLIC BLOOD PRESSURE: 121 MMHG | TEMPERATURE: 98.1 F | HEART RATE: 82 BPM | WEIGHT: 190 LBS | DIASTOLIC BLOOD PRESSURE: 69 MMHG

## 2019-12-12 LAB
A1 MICROGLOB PLACENTAL VAG QL: NEGATIVE
APPEARANCE UR: CLEAR
COLOR UR AUTO: YELLOW
CRYSTALS AMN MICRO: NORMAL
GLUCOSE UR QL STRIP.AUTO: NEGATIVE MG/DL
KETONES UR QL STRIP.AUTO: NEGATIVE MG/DL
LEUKOCYTE ESTERASE UR QL STRIP.AUTO: ABNORMAL
NITRITE UR QL STRIP.AUTO: NEGATIVE
PH UR STRIP.AUTO: 7 [PH] (ref 5–8)
PROT UR QL STRIP: NEGATIVE MG/DL
RBC UR QL AUTO: NEGATIVE
SP GR UR: 1.01 (ref 1–1.03)

## 2019-12-12 PROCEDURE — 81002 URINALYSIS NONAUTO W/O SCOPE: CPT

## 2019-12-12 PROCEDURE — 59025 FETAL NON-STRESS TEST: CPT

## 2019-12-12 PROCEDURE — 84112 EVAL AMNIOTIC FLUID PROTEIN: CPT

## 2019-12-12 PROCEDURE — 89060 EXAM SYNOVIAL FLUID CRYSTALS: CPT

## 2019-12-12 ASSESSMENT — PAIN SCALES - GENERAL: PAINLEVEL: 7

## 2019-12-12 NOTE — DISCHARGE INSTRUCTIONS
General Instructions:  · If you think you are in labor, time contractions (lying on your left side) from the beginning of one contraction to the beginning of the next contraction for at least one hour.  · Increase fluid intake: you should consume 10-12 8 oz glasses of non-caffeinated fluid per day.  · Report any pressure or burning on urination to your physician.  · Monitor fetal movement: If you notice an absence or decrease in fetal movement, drink a large glass of water and rest on your side.  If there is no increase in movement, call your physician or go to the hospital for further evaluation.  · Report any sudden, sharp abdominal pain.  · Report any bleeding.  Spotting or pinkish discharge is normal after vaginal exam.  You may also spot after sexual intercourse.    Pre-term Labor (<37 weeks):  Call your physician or return to the hospital if:  · You have painless regular contractions more than 4 in one hour.  · Your water breaks (remember time and color).  · You have menstrual-like cramps, a low dull backache or pressure in your pelvis or back.  · Your baby does not move enough to complete the daily kick count (10 movements in 2 hours).  · Your baby moves much less often than on the days before or you have not felt your baby move all day.  · Please review the MEDICATION LIST section of your AFTER VISIT SUMMARY document.  · Take your medication as prescribed    *Call the office or return to L&D if abdominal pain worsens or if you develop a fever.         Other Instructions:  Please carefully review your entire AFTER VISIT SUMMARY document for all discharge instructions.

## 2019-12-12 NOTE — PROGRESS NOTES
"24 y.o. , EDC = 31w1d     1302- Pt presents to L&D c/o leaking of clear fluid since yesterday  at 1000. Pt assumed she had urinated and went back to bed. Pt reports this morning noting another big gush of fluid and continued leaking. Pt also reports abdominal tenderness which radiates into her back, tender upon palpation and when baby kicks. Pt denies cramping/ctx, reports baby moving normally. Pt denies fever/chills, reports \"hot flashes\". VSS. Urine dipped. SSE shows no pooling of fluid, white discharge noted, cervix appears closed, fern and amnisure collected. Report to YANET Cesar DNP, order to send specimens to lab     1403- Fern and Amnisure negative. YANET Cesar updated in unit, will come to bedside. YANET Cesar to bedside, assessed pt, d/c order received     1430- D/c instructions discussed with pt including reasons to return to L&D including onset of fever or worsening abdominal pain. Pt also advised about PTL precautions. Pt expresses understanding, ambulatory off unit with parents   " Topical Retinoid counseling:  Patient advised to apply a pea-sized amount only at bedtime and wait 30 minutes after washing their face before applying.  If too drying, patient may add a non-comedogenic moisturizer. The patient verbalized understanding of the proper use and possible adverse effects of retinoids.  All of the patient's questions and concerns were addressed. Bactrim Counseling:  I discussed with the patient the risks of sulfa antibiotics including but not limited to GI upset, allergic reaction, drug rash, diarrhea, dizziness, photosensitivity, and yeast infections.  Rarely, more serious reactions can occur including but not limited to aplastic anemia, agranulocytosis, methemoglobinemia, blood dyscrasias, liver or kidney failure, lung infiltrates or desquamative/blistering drug rashes. Topical Retinoid Pregnancy And Lactation Text: This medication is Pregnancy Category C. It is unknown if this medication is excreted in breast milk. Benzoyl Peroxide Counseling: Patient counseled that medicine may cause skin irritation and bleach clothing.  In the event of skin irritation, the patient was advised to reduce the amount of the drug applied or use it less frequently.   The patient verbalized understanding of the proper use and possible adverse effects of benzoyl peroxide.  All of the patient's questions and concerns were addressed. Minocycline Pregnancy And Lactation Text: This medication is Pregnancy Category D and not consider safe during pregnancy. It is also excreted in breast milk. Topical Clindamycin Counseling: Patient counseled that this medication may cause skin irritation or allergic reactions.  In the event of skin irritation, the patient was advised to reduce the amount of the drug applied or use it less frequently.   The patient verbalized understanding of the proper use and possible adverse effects of clindamycin.  All of the patient's questions and concerns were addressed. Tazorac Counseling:  Patient advised that medication is irritating and drying.  Patient may need to apply sparingly and wash off after an hour before eventually leaving it on overnight.  The patient verbalized understanding of the proper use and possible adverse effects of tazorac.  All of the patient's questions and concerns were addressed. Spironolactone Pregnancy And Lactation Text: This medication can cause feminization of the male fetus and should be avoided during pregnancy. The active metabolite is also found in breast milk. Topical Sulfur Applications Pregnancy And Lactation Text: This medication is Pregnancy Category C and has an unknown safety profile during pregnancy. It is unknown if this topical medication is excreted in breast milk. Doxycycline Counseling:  Patient counseled regarding possible photosensitivity and increased risk for sunburn.  Patient instructed to avoid sunlight, if possible.  When exposed to sunlight, patients should wear protective clothing, sunglasses, and sunscreen.  The patient was instructed to call the office immediately if the following severe adverse effects occur:  hearing changes, easy bruising/bleeding, severe headache, or vision changes.  The patient verbalized understanding of the proper use and possible adverse effects of doxycycline.  All of the patient's questions and concerns were addressed. Dapsone Counseling: I discussed with the patient the risks of dapsone including but not limited to hemolytic anemia, agranulocytosis, rashes, methemoglobinemia, kidney failure, peripheral neuropathy, headaches, GI upset, and liver toxicity.  Patients who start dapsone require monitoring including baseline LFTs and weekly CBCs for the first month, then every month thereafter.  The patient verbalized understanding of the proper use and possible adverse effects of dapsone.  All of the patient's questions and concerns were addressed. Dapsone Pregnancy And Lactation Text: This medication is Pregnancy Category C and is not considered safe during pregnancy or breast feeding. Use Enhanced Medication Counseling?: No Erythromycin Counseling:  I discussed with the patient the risks of erythromycin including but not limited to GI upset, allergic reaction, drug rash, diarrhea, increase in liver enzymes, and yeast infections. Benzoyl Peroxide Pregnancy And Lactation Text: This medication is Pregnancy Category C. It is unknown if benzoyl peroxide is excreted in breast milk. Birth Control Pills Counseling: Birth Control Pill Counseling: I discussed with the patient the potential side effects of OCPs including but not limited to increased risk of stroke, heart attack, thrombophlebitis, deep venous thrombosis, hepatic adenomas, breast changes, GI upset, headaches, and depression.  The patient verbalized understanding of the proper use and possible adverse effects of OCPs. All of the patient's questions and concerns were addressed. High Dose Vitamin A Counseling: Side effects reviewed, pt to contact office should one occur. Spironolactone Counseling: Patient advised regarding risks of diarrhea, abdominal pain, hyperkalemia, birth defects (for female patients), liver toxicity and renal toxicity. The patient may need blood work to monitor liver and kidney function and potassium levels while on therapy. The patient verbalized understanding of the proper use and possible adverse effects of spironolactone.  All of the patient's questions and concerns were addressed. High Dose Vitamin A Pregnancy And Lactation Text: High dose vitamin A therapy is contraindicated during pregnancy and breast feeding. Topical Sulfur Applications Counseling: Topical Sulfur Counseling: Patient counseled that this medication may cause skin irritation or allergic reactions.  In the event of skin irritation, the patient was advised to reduce the amount of the drug applied or use it less frequently.   The patient verbalized understanding of the proper use and possible adverse effects of topical sulfur application.  All of the patient's questions and concerns were addressed. Azithromycin Pregnancy And Lactation Text: This medication is considered safe during pregnancy and is also secreted in breast milk. Birth Control Pills Pregnancy And Lactation Text: This medication should be avoided if pregnant and for the first 30 days post-partum. Detail Level: Simple Topical Clindamycin Pregnancy And Lactation Text: This medication is Pregnancy Category B and is considered safe during pregnancy. It is unknown if it is excreted in breast milk. Minocycline Counseling: Patient advised regarding possible photosensitivity and discoloration of the teeth, skin, lips, tongue and gums.  Patient instructed to avoid sunlight, if possible.  When exposed to sunlight, patients should wear protective clothing, sunglasses, and sunscreen.  The patient was instructed to call the office immediately if the following severe adverse effects occur:  hearing changes, easy bruising/bleeding, severe headache, or vision changes.  The patient verbalized understanding of the proper use and possible adverse effects of minocycline.  All of the patient's questions and concerns were addressed. Isotretinoin Pregnancy And Lactation Text: This medication is Pregnancy Category X and is considered extremely dangerous during pregnancy. It is unknown if it is excreted in breast milk. Tazorac Pregnancy And Lactation Text: This medication is not safe during pregnancy. It is unknown if this medication is excreted in breast milk. Azithromycin Counseling:  I discussed with the patient the risks of azithromycin including but not limited to GI upset, allergic reaction, drug rash, diarrhea, and yeast infections. Doxycycline Pregnancy And Lactation Text: This medication is Pregnancy Category D and not consider safe during pregnancy. It is also excreted in breast milk but is considered safe for shorter treatment courses. Tetracycline Counseling: Patient counseled regarding possible photosensitivity and increased risk for sunburn.  Patient instructed to avoid sunlight, if possible.  When exposed to sunlight, patients should wear protective clothing, sunglasses, and sunscreen.  The patient was instructed to call the office immediately if the following severe adverse effects occur:  hearing changes, easy bruising/bleeding, severe headache, or vision changes.  The patient verbalized understanding of the proper use and possible adverse effects of tetracycline.  All of the patient's questions and concerns were addressed. Patient understands to avoid pregnancy while on therapy due to potential birth defects. Erythromycin Pregnancy And Lactation Text: This medication is Pregnancy Category B and is considered safe during pregnancy. It is also excreted in breast milk. Isotretinoin Counseling: Patient should get monthly blood tests, not donate blood, not drive at night if vision affected, not share medication, and not undergo elective surgery for 6 months after tx completed. Side effects reviewed, pt to contact office should one occur. Bactrim Pregnancy And Lactation Text: This medication is Pregnancy Category D and is known to cause fetal risk.  It is also excreted in breast milk. Detail Level: Zone

## 2019-12-20 ENCOUNTER — ROUTINE PRENATAL (OUTPATIENT)
Dept: OBGYN | Facility: CLINIC | Age: 24
End: 2019-12-20
Payer: MEDICAID

## 2019-12-20 VITALS — SYSTOLIC BLOOD PRESSURE: 116 MMHG | BODY MASS INDEX: 28.21 KG/M2 | DIASTOLIC BLOOD PRESSURE: 70 MMHG | WEIGHT: 191 LBS

## 2019-12-20 DIAGNOSIS — Z34.80 SUPERVISION OF OTHER NORMAL PREGNANCY: ICD-10-CM

## 2019-12-20 PROCEDURE — 90715 TDAP VACCINE 7 YRS/> IM: CPT | Performed by: PHYSICIAN ASSISTANT

## 2019-12-20 PROCEDURE — 90471 IMMUNIZATION ADMIN: CPT | Performed by: PHYSICIAN ASSISTANT

## 2019-12-20 PROCEDURE — 90040 PR PRENATAL FOLLOW UP: CPT | Performed by: PHYSICIAN ASSISTANT

## 2019-12-20 NOTE — PROGRESS NOTES
Pt here today for OB follow up  Pt states she got super dizzy yesterday. Also states stomach is really sore   Reports +FM  Good # [phone]  Pharmacy Confirmed.  Chaperone offered and not indicated.   Pt would like TDAP vaccine, consent signed   Tdap vaccine given. RIGHT Deltoid. VIS given and screening check list reviewed with pt. Verified with TAJ.

## 2019-12-20 NOTE — PROGRESS NOTES
Pt has no complaints with cramping, UCs, Vb, LOF, though pt had episode of dizziness yesterday when out shopping. Pt had been drinking water and eating regularly, so likely fetal position. Pt to call if it occurs again. +FM. TDaP given today. PTL precautions reviewed. RTC 2 wk or sooner prn.

## 2020-01-03 ENCOUNTER — ROUTINE PRENATAL (OUTPATIENT)
Dept: OBGYN | Facility: CLINIC | Age: 25
End: 2020-01-03
Payer: MEDICAID

## 2020-01-03 VITALS — BODY MASS INDEX: 29.09 KG/M2 | WEIGHT: 197 LBS | SYSTOLIC BLOOD PRESSURE: 122 MMHG | DIASTOLIC BLOOD PRESSURE: 60 MMHG

## 2020-01-03 DIAGNOSIS — Z34.83 ENCOUNTER FOR SUPERVISION OF OTHER NORMAL PREGNANCY IN THIRD TRIMESTER: ICD-10-CM

## 2020-01-03 PROCEDURE — 90040 PR PRENATAL FOLLOW UP: CPT | Performed by: NURSE PRACTITIONER

## 2020-01-03 NOTE — PROGRESS NOTES
Pt here today for OB follow up  Reports +FM  WT: 197 lb  BP: 122/60  Pt states she can't breath as much when she is asleep  Or when doing chores or putting on her shoes. States no other complaints.   Isaac # [phone]

## 2020-01-03 NOTE — PROGRESS NOTES
S:  Pt is  at 34w2d for routine OB follow up.  Reports that breathing has become difficult when doing chores and bending over.  Reports good FM.  Denies VB, LOF, RUCs or vaginal DC.    O:  Please see above vitals.        FHTs: 140        Fundal ht: 34 cm.        S=D    A:  IUP at 34w2d  Patient Active Problem List    Diagnosis Date Noted   • Anemia affecting pregnancy in third trimester - 9.9/31.6 2019   • History of postpartum hemorrhage, currently pregnant in third trimester 2019   • History of placenta abruption 2019   • Supervision of other normal pregnancy 2019        P:  1.  GBS @ 36 wks.          2.  Continue FKCs.          3.  Questions answered.          4.  Encouraged pt to tour L&D.          5.  Encourage adequate water intake.        6.  F/u 2 wks.

## 2020-01-15 ENCOUNTER — ROUTINE PRENATAL (OUTPATIENT)
Dept: OBGYN | Facility: CLINIC | Age: 25
End: 2020-01-15
Payer: MEDICAID

## 2020-01-15 VITALS — DIASTOLIC BLOOD PRESSURE: 68 MMHG | WEIGHT: 198 LBS | SYSTOLIC BLOOD PRESSURE: 134 MMHG | BODY MASS INDEX: 29.24 KG/M2

## 2020-01-15 DIAGNOSIS — Z34.80 SUPERVISION OF OTHER NORMAL PREGNANCY: ICD-10-CM

## 2020-01-15 LAB — STREP GP B DNA PCR: NEGATIVE

## 2020-01-15 PROCEDURE — 90040 PR PRENATAL FOLLOW UP: CPT | Performed by: PHYSICIAN ASSISTANT

## 2020-01-15 NOTE — PROGRESS NOTES
Pt seen by ALBERT and overseen by myself. Agree with note and plan.     Note by ALBERT Yao:    S: Pt is a 24 y.o  at 36w0d here today for her prenatal follow-up visit. +FM reported. She continues to have some SOB, and dizziness. She reports having continued adequate water intake. She denies Vb, LOF, and RUC's. She is having increasing pelvic pain and pressure. Pt reports no other complaints at this time. GBS swab was d/w and performed today. She has history of a negative prior GC/CT, but at the patient's request another GC/CT test was done today. Pt to RTC in 1 wk for follow-up prenatal visit.     O:  Vitals: /68 Wt 89.8 kg  Fundal Ht: 36 cm  FHR: 138  1/50/-2 vtx     A/P:  IUP at 36w0d  1. GBS swab performed today and GC/CT at patient's request  2. Continue FKC  3. Continue adequate water intake  4. RTC in 1 wk

## 2020-01-15 NOTE — NON-PROVIDER
S: Pt is a 24 y.o  at 36w0d here today for her prenatal follow-up visit. +FM reported. She continues to have some SOB, and dizziness. She reports having continued adequate water intake. She denies Vb, LOF, and RUC's. She is having increasing pelvic pain and pressure. Pt reports no other complaints at this time. GBS swab was d/w and performed today. . She has history of a negative prior GC/CT, but at the patient's request another GC/CT test was done today. Pt to RTC in 1 wk for follow-up prenatal visit.    O:  Vitals: /68 Wt 89.8 kg  Fundal Ht: 36 cm  FHR: 138  1/50/-2 vtx    A/P:  IUP at 36w0d  1. GBS swab performed today and GC/CT at patient's request  2. Continue FKC  3. Continue adequate water intake  4. RTC in 1 wk

## 2020-01-15 NOTE — PROGRESS NOTES
Pt here today for OB follow up  Pt states having pelvic pain and cannot close legs, also having hip pain.   Reports +FM  Good # [phone]  Pharmacy Confirmed.  Chaperone offered and declined.

## 2020-01-23 ENCOUNTER — HOSPITAL ENCOUNTER (OUTPATIENT)
Facility: MEDICAL CENTER | Age: 25
End: 2020-01-23
Attending: OBSTETRICS & GYNECOLOGY | Admitting: OBSTETRICS & GYNECOLOGY
Payer: MEDICAID

## 2020-01-23 VITALS
DIASTOLIC BLOOD PRESSURE: 58 MMHG | HEART RATE: 73 BPM | WEIGHT: 198 LBS | TEMPERATURE: 98.6 F | BODY MASS INDEX: 29.33 KG/M2 | HEIGHT: 69 IN | SYSTOLIC BLOOD PRESSURE: 116 MMHG

## 2020-01-23 LAB — CRYSTALS AMN MICRO: NORMAL

## 2020-01-23 PROCEDURE — 59025 FETAL NON-STRESS TEST: CPT

## 2020-01-23 PROCEDURE — 59025 FETAL NON-STRESS TEST: CPT | Mod: 26 | Performed by: NURSE PRACTITIONER

## 2020-01-23 PROCEDURE — 89060 EXAM SYNOVIAL FLUID CRYSTALS: CPT

## 2020-01-23 PROCEDURE — 99213 OFFICE O/P EST LOW 20 MIN: CPT | Mod: 25 | Performed by: NURSE PRACTITIONER

## 2020-01-24 ENCOUNTER — ROUTINE PRENATAL (OUTPATIENT)
Dept: OBGYN | Facility: CLINIC | Age: 25
End: 2020-01-24
Payer: MEDICAID

## 2020-01-24 VITALS — DIASTOLIC BLOOD PRESSURE: 68 MMHG | WEIGHT: 199 LBS | SYSTOLIC BLOOD PRESSURE: 118 MMHG | BODY MASS INDEX: 29.39 KG/M2

## 2020-01-24 DIAGNOSIS — Z34.80 SUPERVISION OF OTHER NORMAL PREGNANCY: ICD-10-CM

## 2020-01-24 PROCEDURE — 90040 PR PRENATAL FOLLOW UP: CPT | Performed by: PHYSICIAN ASSISTANT

## 2020-01-24 RX ORDER — ZOLPIDEM TARTRATE 5 MG/1
5 TABLET ORAL NIGHTLY PRN
Qty: 5 TAB | Refills: 0 | Status: SHIPPED | OUTPATIENT
Start: 2020-01-24 | End: 2020-01-29

## 2020-01-24 NOTE — PROGRESS NOTES
"S: Pt is a 24 y.o.  at 37w1d with Estimated Date of Delivery: 20 who presents to triage c/o LOF.  Denies VB or RUCs.  Reports good FM.      O: /58   Pulse 73   Temp 37 °C (98.6 °F) (Temporal)   Ht 1.753 m (5' 9\")   Wt 89.8 kg (198 lb)   LMP 2019   BMI 29.24 kg/m²          NST: Done and read by me         Indication: Term IUP, rule out labor       FHR: 120       Variability: moderate       Acclerations: present       Decelerations: negative       Reactive: yes, category 1         Diaz: Irregular UCs       SVE: /-3 per RN       SSE done by RN- white mucus noted, fern collected         A/P  Patient Active Problem List    Diagnosis Date Noted   • Anemia affecting pregnancy in third trimester - 9.9/31.6 2019   • History of postpartum hemorrhage, currently pregnant in third trimester 2019   • History of placenta abruption 2019   • Supervision of other normal pregnancy 2019     Fern negative, patient comfortable with the few contractions she is having, and results were discussed.    1.  IUP @ 37w1d  2.  Reactive, category 1 NST.  3.  Fern negative  4.  Irregular UC's  5.  F/u TPC at next scheduled appt    Labor precautions reviewed, has follow up 20.    Daja Garcia CNM, APRN    Dr Weiss is the attending today  "

## 2020-01-24 NOTE — PROGRESS NOTES
Pt seen by ALBERT Mendoza and overseen by myself. Agree with note and plan of care. Rx for Ambien given for therapeutic rest today - risks d/w pt and possible habit-forming risk.     Pt has no complaints with cramping, strong UCs, Vb, LOF. +FM. Pt has had some UCs and was seen in L&D last night, told she was 1cm and sent home. Labor precautions stressed. Daily FKC and walks recommended. Cervix: 1/th/-2, firm in int os, post, vtx. RTC 1 wk or sooner prn.

## 2020-01-24 NOTE — PROGRESS NOTES
1927 -  here with EDC of 20 = 37.1 weeks reporting LOF and UCs she has not been timing. Reports positive FM, denies VB. Taken to S218 accompanied by family, instructed to change and call out when ready.    - POC discussed, questions answered. VS taken, monitors applied x2.    - SSE, no pooling, fern collected, SVE /.   - report given to TOMMY Garcia CNM.    - discharge instructions given, all questions answered, understanding verbalized.    - discharged home with friend in stable walking condition.

## 2020-01-30 ENCOUNTER — ROUTINE PRENATAL (OUTPATIENT)
Dept: OBGYN | Facility: CLINIC | Age: 25
End: 2020-01-30
Payer: MEDICAID

## 2020-01-30 VITALS — WEIGHT: 203.9 LBS | BODY MASS INDEX: 30.11 KG/M2 | DIASTOLIC BLOOD PRESSURE: 62 MMHG | SYSTOLIC BLOOD PRESSURE: 126 MMHG

## 2020-01-30 DIAGNOSIS — Z34.80 SUPERVISION OF OTHER NORMAL PREGNANCY: Primary | ICD-10-CM

## 2020-01-30 PROCEDURE — 90040 PR PRENATAL FOLLOW UP: CPT | Performed by: NURSE PRACTITIONER

## 2020-01-30 NOTE — PROGRESS NOTES
S:  Pt is  at 38w1d here for routine OB follow up.  Reports some sharp lightning pains in her pelvic area.  Reports good FM.  Denies VB, LOF, RUCs, or vaginal DC.     O:  Please see above vitals.        FHTs: 150        Fundal ht: 36        Fetal position: vertex        SVE: 1-/-2        GBS neg on 20 -- reviewed w pt.      A:  IUP at 38w1d  Patient Active Problem List    Diagnosis Date Noted   • Anemia affecting pregnancy in third trimester - 9.9/31.6 2019   • History of postpartum hemorrhage, currently pregnant in third trimester 2019   • History of placenta abruption 2019   • Supervision of other normal pregnancy 2019       P:  1.  Continue FKCs.         2.  Labor precautions given.  Instructions given on where to go.  Pt receptive to education.         3.  D/w pt IOL policy.  IOL referral placed.        4.  Questions answered.         5.  Encouraged adequate water intake       6.  F/u 1wk    Chaperone offered and provided by Yesenia Richardson MA.

## 2020-01-30 NOTE — PROGRESS NOTES
OB follow up   + fetal movement.  No VB, LOF or UC's.  Wt: 203.9lb      BP:126/62  Phone # [phone]  Preferred pharmacy confirmed.  GBS negative  Needs Order for IOL  Regular contractions that come and go, last 30 seconds

## 2020-02-04 ENCOUNTER — ROUTINE PRENATAL (OUTPATIENT)
Dept: OBGYN | Facility: CLINIC | Age: 25
End: 2020-02-04
Payer: MEDICAID

## 2020-02-04 VITALS — DIASTOLIC BLOOD PRESSURE: 78 MMHG | WEIGHT: 204 LBS | BODY MASS INDEX: 30.13 KG/M2 | SYSTOLIC BLOOD PRESSURE: 128 MMHG

## 2020-02-04 DIAGNOSIS — Z34.80 SUPERVISION OF OTHER NORMAL PREGNANCY: ICD-10-CM

## 2020-02-04 PROCEDURE — 90040 PR PRENATAL FOLLOW UP: CPT | Performed by: PHYSICIAN ASSISTANT

## 2020-02-04 NOTE — PROGRESS NOTES
Pt has no complaints with cramping, UCs, Vb, LOF, though pt has had some UCs over past week. +FM. IOL scheduled 2/9/20 at 9am - confirmed with L&D, pt given info today. Pt is a single mom to 2yr, 4yr, and 5 yr old, so is very overwhelmed and having trouble doing daily tasks, so needing IOL asap. Pt happy with date. Daily FKC and walks recommended. Labor precautions reviewed. Cervix: 1/th/-2, post, vtx, firm. RTC prn or to L&D Friday for IOL.

## 2020-02-04 NOTE — PROGRESS NOTES
Pt here today for OB follow up  Pt states still having dizzy spells, also very nauseas.   Reports +FM  Good # [phone]  Pharmacy Confirmed.  Chaperone offered and declined.   Patient is scheduled for IOL on 02/19/20 at 9am.

## 2020-02-06 ENCOUNTER — TELEPHONE (OUTPATIENT)
Dept: OBGYN | Facility: CLINIC | Age: 25
End: 2020-02-06

## 2020-02-06 NOTE — TELEPHONE ENCOUNTER
[facility] disability worker called to sharita pt's DAVID and asked if pt delivered. Advised him of IOL for 2/9/2020.

## 2020-02-07 ENCOUNTER — ANESTHESIA (OUTPATIENT)
Dept: ANESTHESIOLOGY | Facility: MEDICAL CENTER | Age: 25
End: 2020-02-07
Payer: MEDICAID

## 2020-02-07 ENCOUNTER — ANESTHESIA EVENT (OUTPATIENT)
Dept: ANESTHESIOLOGY | Facility: MEDICAL CENTER | Age: 25
End: 2020-02-07
Payer: MEDICAID

## 2020-02-07 ENCOUNTER — HOSPITAL ENCOUNTER (INPATIENT)
Facility: MEDICAL CENTER | Age: 25
LOS: 1 days | End: 2020-02-08
Attending: OBSTETRICS & GYNECOLOGY | Admitting: OBSTETRICS & GYNECOLOGY
Payer: MEDICAID

## 2020-02-07 ENCOUNTER — APPOINTMENT (OUTPATIENT)
Dept: OBGYN | Facility: MEDICAL CENTER | Age: 25
End: 2020-02-07
Attending: OBSTETRICS & GYNECOLOGY
Payer: MEDICAID

## 2020-02-07 LAB
ANISOCYTOSIS BLD QL SMEAR: ABNORMAL
BASOPHILS # BLD AUTO: 0.5 % (ref 0–1.8)
BASOPHILS # BLD: 0.04 K/UL (ref 0–0.12)
COMMENT 1642: NORMAL
EOSINOPHIL # BLD AUTO: 0.05 K/UL (ref 0–0.51)
EOSINOPHIL NFR BLD: 0.6 % (ref 0–6.9)
ERYTHROCYTE [DISTWIDTH] IN BLOOD BY AUTOMATED COUNT: 49.3 FL (ref 35.9–50)
HCT VFR BLD AUTO: 32 % (ref 37–47)
HGB BLD-MCNC: 9.5 G/DL (ref 12–16)
HOLDING TUBE BB 8507: NORMAL
IMM GRANULOCYTES # BLD AUTO: 0.1 K/UL (ref 0–0.11)
IMM GRANULOCYTES NFR BLD AUTO: 1.2 % (ref 0–0.9)
LYMPHOCYTES # BLD AUTO: 2.43 K/UL (ref 1–4.8)
LYMPHOCYTES NFR BLD: 29 % (ref 22–41)
MCH RBC QN AUTO: 19.7 PG (ref 27–33)
MCHC RBC AUTO-ENTMCNC: 29.7 G/DL (ref 33.6–35)
MCV RBC AUTO: 66.4 FL (ref 81.4–97.8)
MICROCYTES BLD QL SMEAR: ABNORMAL
MONOCYTES # BLD AUTO: 0.77 K/UL (ref 0–0.85)
MONOCYTES NFR BLD AUTO: 9.2 % (ref 0–13.4)
MORPHOLOGY BLD-IMP: NORMAL
NEUTROPHILS # BLD AUTO: 4.99 K/UL (ref 2–7.15)
NEUTROPHILS NFR BLD: 59.5 % (ref 44–72)
NRBC # BLD AUTO: 0 K/UL
NRBC BLD-RTO: 0 /100 WBC
OVALOCYTES BLD QL SMEAR: NORMAL
PLATELET # BLD AUTO: 173 K/UL (ref 164–446)
PLATELET BLD QL SMEAR: NORMAL
POIKILOCYTOSIS BLD QL SMEAR: NORMAL
POLYCHROMASIA BLD QL SMEAR: NORMAL
RBC # BLD AUTO: 4.82 M/UL (ref 4.2–5.4)
RBC BLD AUTO: PRESENT
SCHISTOCYTES BLD QL SMEAR: NORMAL
WBC # BLD AUTO: 8.4 K/UL (ref 4.8–10.8)

## 2020-02-07 PROCEDURE — 700105 HCHG RX REV CODE 258: Performed by: OBSTETRICS & GYNECOLOGY

## 2020-02-07 PROCEDURE — A9270 NON-COVERED ITEM OR SERVICE: HCPCS

## 2020-02-07 PROCEDURE — 303615 HCHG EPIDURAL/SPINAL ANESTHESIA FOR LABOR

## 2020-02-07 PROCEDURE — A9270 NON-COVERED ITEM OR SERVICE: HCPCS | Performed by: OBSTETRICS & GYNECOLOGY

## 2020-02-07 PROCEDURE — 85025 COMPLETE CBC W/AUTO DIFF WBC: CPT

## 2020-02-07 PROCEDURE — 770002 HCHG ROOM/CARE - OB PRIVATE (112)

## 2020-02-07 PROCEDURE — 10907ZC DRAINAGE OF AMNIOTIC FLUID, THERAPEUTIC FROM PRODUCTS OF CONCEPTION, VIA NATURAL OR ARTIFICIAL OPENING: ICD-10-PCS | Performed by: OBSTETRICS & GYNECOLOGY

## 2020-02-07 PROCEDURE — 304965 HCHG RECOVERY SERVICES

## 2020-02-07 PROCEDURE — 36415 COLL VENOUS BLD VENIPUNCTURE: CPT

## 2020-02-07 PROCEDURE — 59409 OBSTETRICAL CARE: CPT

## 2020-02-07 PROCEDURE — 700111 HCHG RX REV CODE 636 W/ 250 OVERRIDE (IP)

## 2020-02-07 PROCEDURE — 700102 HCHG RX REV CODE 250 W/ 637 OVERRIDE(OP): Performed by: OBSTETRICS & GYNECOLOGY

## 2020-02-07 PROCEDURE — 700105 HCHG RX REV CODE 258

## 2020-02-07 PROCEDURE — 700111 HCHG RX REV CODE 636 W/ 250 OVERRIDE (IP): Performed by: ANESTHESIOLOGY

## 2020-02-07 PROCEDURE — 700111 HCHG RX REV CODE 636 W/ 250 OVERRIDE (IP): Performed by: OBSTETRICS & GYNECOLOGY

## 2020-02-07 PROCEDURE — 700111 HCHG RX REV CODE 636 W/ 250 OVERRIDE (IP): Performed by: NURSE PRACTITIONER

## 2020-02-07 PROCEDURE — 700102 HCHG RX REV CODE 250 W/ 637 OVERRIDE(OP)

## 2020-02-07 PROCEDURE — 3E033VJ INTRODUCTION OF OTHER HORMONE INTO PERIPHERAL VEIN, PERCUTANEOUS APPROACH: ICD-10-PCS | Performed by: OBSTETRICS & GYNECOLOGY

## 2020-02-07 RX ORDER — TERBUTALINE SULFATE 1 MG/ML
0.25 INJECTION, SOLUTION SUBCUTANEOUS PRN
Status: DISCONTINUED | OUTPATIENT
Start: 2020-02-07 | End: 2020-02-07 | Stop reason: HOSPADM

## 2020-02-07 RX ORDER — DOCUSATE SODIUM 100 MG/1
100 CAPSULE, LIQUID FILLED ORAL 2 TIMES DAILY PRN
Status: DISCONTINUED | OUTPATIENT
Start: 2020-02-07 | End: 2020-02-08 | Stop reason: HOSPADM

## 2020-02-07 RX ORDER — ROPIVACAINE HYDROCHLORIDE 2 MG/ML
INJECTION, SOLUTION EPIDURAL; INFILTRATION; PERINEURAL CONTINUOUS
Status: DISCONTINUED | OUTPATIENT
Start: 2020-02-07 | End: 2020-02-08 | Stop reason: HOSPADM

## 2020-02-07 RX ORDER — VITAMIN A ACETATE, BETA CAROTENE, ASCORBIC ACID, CHOLECALCIFEROL, .ALPHA.-TOCOPHEROL ACETATE, DL-, THIAMINE MONONITRATE, RIBOFLAVIN, NIACINAMIDE, PYRIDOXINE HYDROCHLORIDE, FOLIC ACID, CYANOCOBALAMIN, CALCIUM CARBONATE, FERROUS FUMARATE, ZINC OXIDE, CUPRIC OXIDE 3080; 12; 120; 400; 1; 1.84; 3; 20; 22; 920; 25; 200; 27; 10; 2 [IU]/1; UG/1; MG/1; [IU]/1; MG/1; MG/1; MG/1; MG/1; MG/1; [IU]/1; MG/1; MG/1; MG/1; MG/1; MG/1
1 TABLET, FILM COATED ORAL EVERY MORNING
Status: DISCONTINUED | OUTPATIENT
Start: 2020-02-08 | End: 2020-02-08 | Stop reason: HOSPADM

## 2020-02-07 RX ORDER — CITRIC ACID/SODIUM CITRATE 334-500MG
30 SOLUTION, ORAL ORAL EVERY 6 HOURS PRN
Status: DISCONTINUED | OUTPATIENT
Start: 2020-02-07 | End: 2020-02-07 | Stop reason: HOSPADM

## 2020-02-07 RX ORDER — SODIUM CHLORIDE, SODIUM LACTATE, POTASSIUM CHLORIDE, CALCIUM CHLORIDE 600; 310; 30; 20 MG/100ML; MG/100ML; MG/100ML; MG/100ML
INJECTION, SOLUTION INTRAVENOUS CONTINUOUS
Status: ACTIVE | OUTPATIENT
Start: 2020-02-07 | End: 2020-02-07

## 2020-02-07 RX ORDER — ENEMA 19; 7 G/133ML; G/133ML
1 ENEMA RECTAL PRN
Status: DISCONTINUED | OUTPATIENT
Start: 2020-02-07 | End: 2020-02-07 | Stop reason: HOSPADM

## 2020-02-07 RX ORDER — MISOPROSTOL 200 UG/1
600 TABLET ORAL
Status: DISCONTINUED | OUTPATIENT
Start: 2020-02-07 | End: 2020-02-08 | Stop reason: HOSPADM

## 2020-02-07 RX ORDER — ROPIVACAINE HYDROCHLORIDE 2 MG/ML
INJECTION, SOLUTION EPIDURAL; INFILTRATION; PERINEURAL
Status: COMPLETED
Start: 2020-02-07 | End: 2020-02-07

## 2020-02-07 RX ORDER — BISACODYL 10 MG
10 SUPPOSITORY, RECTAL RECTAL PRN
Status: DISCONTINUED | OUTPATIENT
Start: 2020-02-07 | End: 2020-02-08 | Stop reason: HOSPADM

## 2020-02-07 RX ORDER — MISOPROSTOL 200 UG/1
TABLET ORAL
Status: COMPLETED
Start: 2020-02-07 | End: 2020-02-07

## 2020-02-07 RX ORDER — ONDANSETRON 2 MG/ML
4 INJECTION INTRAMUSCULAR; INTRAVENOUS EVERY 6 HOURS PRN
Status: DISCONTINUED | OUTPATIENT
Start: 2020-02-07 | End: 2020-02-08 | Stop reason: HOSPADM

## 2020-02-07 RX ORDER — DEXTROSE, SODIUM CHLORIDE, SODIUM LACTATE, POTASSIUM CHLORIDE, AND CALCIUM CHLORIDE 5; .6; .31; .03; .02 G/100ML; G/100ML; G/100ML; G/100ML; G/100ML
INJECTION, SOLUTION INTRAVENOUS CONTINUOUS
Status: DISCONTINUED | OUTPATIENT
Start: 2020-02-07 | End: 2020-02-08 | Stop reason: HOSPADM

## 2020-02-07 RX ORDER — CARBOPROST TROMETHAMINE 250 UG/ML
250 INJECTION, SOLUTION INTRAMUSCULAR
Status: DISCONTINUED | OUTPATIENT
Start: 2020-02-07 | End: 2020-02-08 | Stop reason: HOSPADM

## 2020-02-07 RX ORDER — SODIUM CHLORIDE, SODIUM LACTATE, POTASSIUM CHLORIDE, CALCIUM CHLORIDE 600; 310; 30; 20 MG/100ML; MG/100ML; MG/100ML; MG/100ML
INJECTION, SOLUTION INTRAVENOUS
Status: COMPLETED
Start: 2020-02-07 | End: 2020-02-07

## 2020-02-07 RX ORDER — SODIUM CHLORIDE, SODIUM LACTATE, POTASSIUM CHLORIDE, AND CALCIUM CHLORIDE .6; .31; .03; .02 G/100ML; G/100ML; G/100ML; G/100ML
1000 INJECTION, SOLUTION INTRAVENOUS
Status: COMPLETED | OUTPATIENT
Start: 2020-02-07 | End: 2020-02-07

## 2020-02-07 RX ORDER — METOCLOPRAMIDE HYDROCHLORIDE 5 MG/ML
10 INJECTION INTRAMUSCULAR; INTRAVENOUS EVERY 6 HOURS PRN
Status: DISCONTINUED | OUTPATIENT
Start: 2020-02-07 | End: 2020-02-08 | Stop reason: HOSPADM

## 2020-02-07 RX ORDER — ONDANSETRON 2 MG/ML
INJECTION INTRAMUSCULAR; INTRAVENOUS
Status: COMPLETED
Start: 2020-02-07 | End: 2020-02-07

## 2020-02-07 RX ORDER — ALUMINA, MAGNESIA, AND SIMETHICONE 2400; 2400; 240 MG/30ML; MG/30ML; MG/30ML
30 SUSPENSION ORAL EVERY 6 HOURS PRN
Status: DISCONTINUED | OUTPATIENT
Start: 2020-02-07 | End: 2020-02-07 | Stop reason: HOSPADM

## 2020-02-07 RX ORDER — ACETAMINOPHEN 325 MG/1
325 TABLET ORAL EVERY 4 HOURS PRN
Status: DISCONTINUED | OUTPATIENT
Start: 2020-02-07 | End: 2020-02-08 | Stop reason: HOSPADM

## 2020-02-07 RX ORDER — METHYLERGONOVINE MALEATE 0.2 MG/ML
0.2 INJECTION INTRAVENOUS
Status: DISCONTINUED | OUTPATIENT
Start: 2020-02-07 | End: 2020-02-08 | Stop reason: HOSPADM

## 2020-02-07 RX ORDER — IBUPROFEN 600 MG/1
600 TABLET ORAL EVERY 6 HOURS PRN
Status: DISCONTINUED | OUTPATIENT
Start: 2020-02-07 | End: 2020-02-08 | Stop reason: HOSPADM

## 2020-02-07 RX ORDER — HYDROXYZINE 50 MG/1
50 TABLET, FILM COATED ORAL EVERY 6 HOURS PRN
Status: DISCONTINUED | OUTPATIENT
Start: 2020-02-07 | End: 2020-02-07 | Stop reason: HOSPADM

## 2020-02-07 RX ORDER — METHYLERGONOVINE MALEATE 0.2 MG/ML
0.2 INJECTION INTRAVENOUS ONCE
Status: COMPLETED | OUTPATIENT
Start: 2020-02-07 | End: 2020-02-07

## 2020-02-07 RX ORDER — SODIUM CHLORIDE, SODIUM LACTATE, POTASSIUM CHLORIDE, AND CALCIUM CHLORIDE .6; .31; .03; .02 G/100ML; G/100ML; G/100ML; G/100ML
250 INJECTION, SOLUTION INTRAVENOUS PRN
Status: DISCONTINUED | OUTPATIENT
Start: 2020-02-07 | End: 2020-02-07 | Stop reason: HOSPADM

## 2020-02-07 RX ORDER — SODIUM CHLORIDE, SODIUM LACTATE, POTASSIUM CHLORIDE, CALCIUM CHLORIDE 600; 310; 30; 20 MG/100ML; MG/100ML; MG/100ML; MG/100ML
INJECTION, SOLUTION INTRAVENOUS
Status: ACTIVE
Start: 2020-02-07 | End: 2020-02-07

## 2020-02-07 RX ORDER — ONDANSETRON 4 MG/1
4 TABLET, ORALLY DISINTEGRATING ORAL EVERY 6 HOURS PRN
Status: DISCONTINUED | OUTPATIENT
Start: 2020-02-07 | End: 2020-02-08 | Stop reason: HOSPADM

## 2020-02-07 RX ORDER — SODIUM CHLORIDE, SODIUM LACTATE, POTASSIUM CHLORIDE, CALCIUM CHLORIDE 600; 310; 30; 20 MG/100ML; MG/100ML; MG/100ML; MG/100ML
INJECTION, SOLUTION INTRAVENOUS PRN
Status: DISCONTINUED | OUTPATIENT
Start: 2020-02-07 | End: 2020-02-08 | Stop reason: HOSPADM

## 2020-02-07 RX ORDER — ACETAMINOPHEN 325 MG/1
650 TABLET ORAL EVERY 4 HOURS PRN
Status: DISCONTINUED | OUTPATIENT
Start: 2020-02-07 | End: 2020-02-08 | Stop reason: HOSPADM

## 2020-02-07 RX ADMIN — SODIUM CHLORIDE, POTASSIUM CHLORIDE, SODIUM LACTATE AND CALCIUM CHLORIDE 1000 ML: 600; 310; 30; 20 INJECTION, SOLUTION INTRAVENOUS at 15:30

## 2020-02-07 RX ADMIN — IBUPROFEN 600 MG: 600 TABLET ORAL at 22:52

## 2020-02-07 RX ADMIN — ROPIVACAINE HYDROCHLORIDE: 2 INJECTION, SOLUTION EPIDURAL; INFILTRATION; PERINEURAL at 15:50

## 2020-02-07 RX ADMIN — ALUMINUM HYDROXIDE, MAGNESIUM HYDROXIDE,SIMETHICONE 30 ML: 400; 400; 40 LIQUID ORAL at 20:16

## 2020-02-07 RX ADMIN — ROPIVACAINE HYDROCHLORIDE: 2 INJECTION, SOLUTION EPIDURAL; INFILTRATION at 15:50

## 2020-02-07 RX ADMIN — ONDANSETRON 4 MG: 2 INJECTION INTRAMUSCULAR; INTRAVENOUS at 17:22

## 2020-02-07 RX ADMIN — ACETAMINOPHEN 650 MG: 325 TABLET, FILM COATED ORAL at 22:52

## 2020-02-07 RX ADMIN — SODIUM CHLORIDE, SODIUM LACTATE, POTASSIUM CHLORIDE, AND CALCIUM CHLORIDE 1000 ML: .6; .31; .03; .02 INJECTION, SOLUTION INTRAVENOUS at 15:30

## 2020-02-07 RX ADMIN — OXYTOCIN 2 MILLI-UNITS/MIN: 10 INJECTION, SOLUTION INTRAMUSCULAR; INTRAVENOUS at 11:23

## 2020-02-07 RX ADMIN — MISOPROSTOL 800 MCG: 200 TABLET ORAL at 19:35

## 2020-02-07 RX ADMIN — SODIUM CHLORIDE, POTASSIUM CHLORIDE, SODIUM LACTATE AND CALCIUM CHLORIDE: 600; 310; 30; 20 INJECTION, SOLUTION INTRAVENOUS at 11:23

## 2020-02-07 RX ADMIN — FENTANYL CITRATE 15 MCG: 50 INJECTION, SOLUTION INTRAMUSCULAR; INTRAVENOUS at 15:50

## 2020-02-07 RX ADMIN — METHYLERGONOVINE MALEATE 0.2 MG: 0.2 INJECTION, SOLUTION INTRAMUSCULAR; INTRAVENOUS at 19:56

## 2020-02-07 RX ADMIN — OXYTOCIN 125 ML/HR: 10 INJECTION, SOLUTION INTRAMUSCULAR; INTRAVENOUS at 20:53

## 2020-02-07 RX ADMIN — OXYTOCIN 2000 ML/HR: 10 INJECTION, SOLUTION INTRAMUSCULAR; INTRAVENOUS at 19:30

## 2020-02-07 ASSESSMENT — PATIENT HEALTH QUESTIONNAIRE - PHQ9
SUM OF ALL RESPONSES TO PHQ9 QUESTIONS 1 AND 2: 0
1. LITTLE INTEREST OR PLEASURE IN DOING THINGS: NOT AT ALL
2. FEELING DOWN, DEPRESSED, IRRITABLE, OR HOPELESS: NOT AT ALL

## 2020-02-07 ASSESSMENT — COPD QUESTIONNAIRES
IN THE PAST 12 MONTHS DO YOU DO LESS THAN YOU USED TO BECAUSE OF YOUR BREATHING PROBLEMS: DISAGREE/UNSURE
DURING THE PAST 4 WEEKS HOW MUCH DID YOU FEEL SHORT OF BREATH: NONE/LITTLE OF THE TIME
DO YOU EVER COUGH UP ANY MUCUS OR PHLEGM?: NO/ONLY WITH OCCASIONAL COLDS OR INFECTIONS
HAVE YOU SMOKED AT LEAST 100 CIGARETTES IN YOUR ENTIRE LIFE: NO/DON'T KNOW

## 2020-02-07 ASSESSMENT — LIFESTYLE VARIABLES
EVER HAD A DRINK FIRST THING IN THE MORNING TO STEADY YOUR NERVES TO GET RID OF A HANGOVER: NO
TOTAL SCORE: 0
ALCOHOL_USE: NO
EVER FELT BAD OR GUILTY ABOUT YOUR DRINKING: NO
EVER_SMOKED: NEVER
TOTAL SCORE: 0
TOTAL SCORE: 0
CONSUMPTION TOTAL: INCOMPLETE
HAVE PEOPLE ANNOYED YOU BY CRITICIZING YOUR DRINKING: NO
HAVE YOU EVER FELT YOU SHOULD CUT DOWN ON YOUR DRINKING: NO

## 2020-02-07 ASSESSMENT — PAIN SCALES - GENERAL: PAIN_LEVEL: 0

## 2020-02-07 NOTE — PROGRESS NOTES
1030- Assumed care of pt. Pt arrived to L&D for IOL. POC discussed. Pt verbalized understanding. EFM applied, vitals WNL.     1100- Report given to Dr. Coles, orders received for pitocin induction.     1530- Pt requesting epidural. Anesthesia notified of pts desire.     1544- Anesthesia at bedside.     1550- Epidural catheter placed. Test dose.

## 2020-02-07 NOTE — ANESTHESIA PREPROCEDURE EVALUATION
23 yo  @ 39w2d presenting in labor requesting labor epidural    Relevant Problems   NEURO   (+) History of placenta abruption   (+) History of postpartum hemorrhage, currently pregnant in third trimester       Physical Exam    Airway   Mallampati: II  TM distance: >3 FB  Neck ROM: full       Cardiovascular - normal exam  Rhythm: regular  Rate: normal  (-) murmur     Dental - normal exam         Pulmonary - normal exam  Breath sounds clear to auscultation     Abdominal    Neurological - normal exam                 Anesthesia Plan    ASA 2       Plan - CSE   Neuraxial block will be labor analgesia              Pertinent diagnostic labs and testing reviewed    Informed Consent:    Anesthetic plan and risks discussed with patient.    Use of blood products discussed with: patient whom consented to blood products.

## 2020-02-07 NOTE — H&P
History and Physical    Tiara Martin is a 24 y.o. female  -Para:     Gestational Age:  39w2d  Admitted for:   Induction of Labor, elective  Admitted to  [facility].  [facility]    HPI: Patient is a 24-year-old -0-0-3 at 39 weeks 2 days based off of 6-week ultrasound here for induction of labor.  This is an elective induction of labor.  She reports occasional contractions that started last night but are not regular.  She states this pregnancy has been uncomplicated.  Loss of fluid:   negative  Abdominal Pain:  negative  Uterine Contractions:  Positive, irregular  Vaginal Bleeding:  negative  Fetal Movement:  normal  Patient denies fever, chills, nausea, vomiting , headache, visual disturbance, or dysuria  Patient's last menstrual period was 2019.  Estimated Date of Delivery: 20  Final DAVID: 2020, by Ultrasound    Patient Active Problem List    Diagnosis Date Noted   • Anemia affecting pregnancy in third trimester - .931.6 2019   • History of postpartum hemorrhage, currently pregnant in third trimester 2019   • History of placenta abruption 2019   • Supervision of other normal pregnancy 2019       Admitting DX: Pregnancy  39 weeks gestation of pregnancy  39 weeks gestation of pregnancy  Pregnancy Complications:  none  OB Risk Factors:   none  Labor State:    Early latent labor.    History:   has a past medical history of Anemia affecting pregnancy in third trimester - 9..6 (2019). She also has no past medical history of Addisons disease ([facility]), Adrenal disorder ([facility]), Allergy, Anxiety, Arrhythmia, Arthritis, Asthma, Blood transfusion without reported diagnosis, Cancer ([facility]), Cataract, CHF (congestive heart failure) ([facility]), Clotting disorder ([facility]), COPD (chronic obstructive pulmonary disease) ([facility]), Cushings syndrome ([facility]), Depression, Diabetes ([facility]), Diabetic neuropathy ([facility]), GERD  (gastroesophageal reflux disease), Glaucoma, Goiter, Heart attack (HCC), Heart murmur, HIV (human immunodeficiency virus infection) (HCC), Hyperlipidemia, Hypertension, IBD (inflammatory bowel disease), Kidney disease, Meningitis, Migraine, Muscle disorder, Osteoporosis, Parathyroid disorder (HCC), Pituitary disease (HCC), Pulmonary emphysema (HCC), Seizure (HCC), Sickle cell disease (HCC), Stroke (HCC), Substance abuse (HCC), Thyroid disease, Tuberculosis, or Urinary tract infection.  Medical history: Anemia  Surgical history: Denies  GYN history: Patient is a -0-0-3.  She reports her past babies have weighed between pounds 9 ounces and 7 pounds 6 ounces.  She reports a hemorrhage after the last pregnancy.  Per the delivery note, it appears that she had about 600 mL of blood loss with less delivery.  Did not require blood transfusion.  Family history: 2 diabetes and hypertension in father.  Social history: Denies alcohol use, drug use, cigarette use during pregnancy.     has no past surgical history on file.    OB History    Para Term  AB Living   4 3 3     3   SAB TAB Ectopic Molar Multiple Live Births             3      # Outcome Date GA Lbr Ervin/2nd Weight Sex Delivery Anes PTL Lv   4 Current            3 Term 18 39w0d  2.977 kg (6 lb 9 oz) M Vag-Spont EPI N RAKESH      Birth Comments: Pt states was told bottom of her uterus wasn't closing.    2 Term 16 39w0d  2.92 kg (6 lb 7 oz) M Vag-Spont EPI N RAKESH      Birth Comments: Pt states no complications.    1 Term 14 39w0d  2.977 kg (6 lb 9 oz) F Vag-Spont EPI N RAKESH      Birth Comments: Pt states no complications.        Medications:  No current facility-administered medications on file prior to encounter.      Current Outpatient Medications on File Prior to Encounter   Medication Sig Dispense Refill   • ferrous sulfate 325 (65 Fe) MG tablet Take 1 Tab by mouth every day. 30 Tab 4   • Prenatal MV-Min-Fe Fum-FA-DHA (PRENATAL 1 PO) Take  " by mouth.         Allergies:  Shellfish allergy    ROS:   Neuro: negative for headache, visual changes  Cardiovascular: negative for chest pain  Gastro intestinal: negative for vomiting, diarrhea  Genitourinary: negative for blood in urine            Physical Exam:  /56   Pulse 77   Temp 36.8 °C (98.2 °F) (Temporal)   Resp 17   Ht 1.753 m (5' 9\")   Wt 92.5 kg (204 lb)   LMP 04/25/2019   BMI 30.13 kg/m²   Constitutional: healthy-appearing, Well-developed, in no acute distress  HEENT: EOMI and Sclera clear, anicteric  Cardio: regular rate and rhythm, S1, S2 normal, no murmur, click, rub or gallop  Lung: unlabored respirations, no intercostal retractions or accessory muscle use  Abdomen: abdomen is soft without significant tenderness, masses, organomegaly or guarding  Extremity: no edema, redness or tenderness in the calves or thighs    Cervical Exam: 60%  Cervix Dilatation: 2  Station: negative 2  Pelvis: Normal  Fetal Assessment: Fetal heart variability: moderate  Fetal Heart Rate decelerations: none  Fetal Heart Rate accelerations: yes  Uterine contractions: irregular, every 10 minutes  Estimated Fetal Weight: 3000 - 3500g      Labs:      Prenatal Results     General (Most Recent Result)     Test Value Date Time    ABO o  11/11/19     Rh +  11/11/19     Antibody screen neg  11/11/19     HbA1c       Gonorrhea       Chlamydia  by PCR       Chlamydia by DNA       RPR/Syphilus non-reactive  11/11/19     HSV 1/2 by PCR (non-serum)       HSV 1/2 (serum)       HSV 1       HSV 2       HPV (16)       HPV (18)       HPV (other)       HBsAg neg  11/11/19     HIV-1 HIV-2 Antibodies neg  11/11/19     Rubella immune  11/11/19     Tb             Pap Smear (Most Recent Result)     Test Value Date Time    Pap smear       Pap smear w/HPV       Pap smear w/CTNG       Pap smar w/HPV CTNG       Pap smear (reflex HPV ACUS)       Pap smear (reflex HPV ASCUS w/CTNG)       Pathology gyn specimen             Urinalysis (Most " Recent Result)     Test Value Date Time    Urinalysis       Urinalysis (culture if indicated)       POC urinalysis  (See Report)   09/09/19 0908    Urine drug screen       Urine drug screen (w/o conf)       Urine culture ([id number])       Urine culture ([id number])  Abnormal    (See Report)   07/25/12 1840          1st Trimester     Test Value Date Time    Hgb       Hct       Fasting Glucose Tolerance       GTT, 1 hour       GTT, 2 hours       GTT, 3 hours             2nd Trimester     Test Value Date Time    Hgb       Hct       Urinalysis       Urine Culture       AST       ALT       Uric Acid       Fasting Glucose Tolerance       GTT, 1 hour       GTT, 2 hours       GTT, 3 hours       Urine Protein/Creatinine Ratio             3rd Trimester     Test Value Date Time    Hgb       Hct       Platelet count       GBS (VALERO BROTH) Negative  01/15/20     GBS (Direct) Negative  01/15/20     Urinalysis       Urine Culture       Urine Drug Screen       Urine Protein/Creatinine Ratio             Congenital Disease Screening     Test Value Date Time    First Trimester Screen       Quad Screen       Sickle Cell       Thalassemia       [facility]       Cystic Fibrosis Carrier Study                     Assessment:  Gestational Age:  39w2d  Labor State:   Early latent labor  Risk Factors:   none  Pregnancy Complications: none    Patient Active Problem List    Diagnosis Date Noted   • Anemia affecting pregnancy in third trimester - 9.9/31.6 12/06/2019   • History of postpartum hemorrhage, currently pregnant in third trimester 11/22/2019   • History of placenta abruption 11/22/2019   • Supervision of other normal pregnancy 09/09/2019       Plan:   Admitted for: Induction of Labor  IOL: Patient's cervix if favorable. Will start pitocin. Will continue to monitor patient and will do amniotomy once contractions become more regular.  GBS: negative  Blood type: O+  Pain control: patient is requesting an epidural.    Brennan Evans,  M.D.

## 2020-02-07 NOTE — ANESTHESIA PROCEDURE NOTES
CSE Block  Date/Time: 2/7/2020 3:50 PM  Performed by: Yi Beal M.D.  Authorized by: Yi Beal M.D.     Patient Location:  OB  Start Time:  2/7/2020 3:50 PM  End Time:  2/7/2020 3:55 PM  Reason for Block: primary anesthetic and labor analgesia    patient identified, IV checked, site marked, risks and benefits discussed, surgical consent, monitors and equipment checked, pre-op evaluation and timeout performed    Patient Position:  Sitting  Prep: ChloraPrep, patient draped and sterile technique    Monitoring:  Blood pressure, continuous pulse oximetry and heart rate  Approach:  Midline  Needle Type:  Pencan  Needle Gauge:  25 G  Injection Technique:  JOSE air  Needle Type:  Tuohy  Needle Gauge:  17 G  Needle Length:  3.5  Loss of resistance:  5.5  Location:  L3-L4  Catheter Size:  19 G  Catheter at Skin Depth:  11  Epidural test dose: no test dose.  Sensory Level:  T10   No complications. Pt tolerated procedure well. Negative heme or CSF on catheter aspiration.

## 2020-02-08 VITALS
RESPIRATION RATE: 16 BRPM | SYSTOLIC BLOOD PRESSURE: 113 MMHG | HEART RATE: 59 BPM | HEIGHT: 69 IN | WEIGHT: 204 LBS | DIASTOLIC BLOOD PRESSURE: 67 MMHG | BODY MASS INDEX: 30.21 KG/M2 | TEMPERATURE: 97.7 F | OXYGEN SATURATION: 94 %

## 2020-02-08 LAB
ANISOCYTOSIS BLD QL SMEAR: NORMAL
BASOPHILS # BLD AUTO: 0 % (ref 0–1.8)
BASOPHILS # BLD: 0 K/UL (ref 0–0.12)
EOSINOPHIL # BLD AUTO: 0 K/UL (ref 0–0.51)
EOSINOPHIL NFR BLD: 0 % (ref 0–6.9)
ERYTHROCYTE [DISTWIDTH] IN BLOOD BY AUTOMATED COUNT: 47.8 FL (ref 35.9–50)
HCT VFR BLD AUTO: 29.2 % (ref 37–47)
HGB BLD-MCNC: 8.8 G/DL (ref 12–16)
HYPOCHROMIA BLD QL SMEAR: NORMAL
LYMPHOCYTES # BLD AUTO: 2.39 K/UL (ref 1–4.8)
LYMPHOCYTES NFR BLD: 23.7 % (ref 22–41)
MACROCYTES BLD QL SMEAR: NORMAL
MANUAL DIFF BLD: ABNORMAL
MCH RBC QN AUTO: 19.9 PG (ref 27–33)
MCHC RBC AUTO-ENTMCNC: 30.1 G/DL (ref 33.6–35)
MCV RBC AUTO: 65.9 FL (ref 81.4–97.8)
MICROCYTES BLD QL SMEAR: NORMAL
MONOCYTES # BLD AUTO: 0.09 K/UL (ref 0–0.85)
MONOCYTES NFR BLD AUTO: 0.9 % (ref 0–13.4)
MORPHOLOGY BLD-IMP: NORMAL
NEUTROPHILS # BLD AUTO: 7.62 K/UL (ref 2–7.15)
NEUTROPHILS NFR BLD: 75.4 % (ref 44–72)
PLATELET # BLD AUTO: 171 K/UL (ref 164–446)
PLATELET BLD QL SMEAR: NORMAL
POIKILOCYTOSIS BLD QL SMEAR: NORMAL
POLYCHROMASIA BLD QL SMEAR: NORMAL
RBC # BLD AUTO: 4.43 M/UL (ref 4.2–5.4)
RBC BLD AUTO: PRESENT
SCHISTOCYTES BLD QL SMEAR: NORMAL
WBC # BLD AUTO: 10.1 K/UL (ref 4.8–10.8)

## 2020-02-08 PROCEDURE — 36415 COLL VENOUS BLD VENIPUNCTURE: CPT

## 2020-02-08 PROCEDURE — 700102 HCHG RX REV CODE 250 W/ 637 OVERRIDE(OP): Performed by: OBSTETRICS & GYNECOLOGY

## 2020-02-08 PROCEDURE — A9270 NON-COVERED ITEM OR SERVICE: HCPCS | Performed by: OBSTETRICS & GYNECOLOGY

## 2020-02-08 PROCEDURE — A9270 NON-COVERED ITEM OR SERVICE: HCPCS | Performed by: NURSE PRACTITIONER

## 2020-02-08 PROCEDURE — 85027 COMPLETE CBC AUTOMATED: CPT

## 2020-02-08 PROCEDURE — 85007 BL SMEAR W/DIFF WBC COUNT: CPT

## 2020-02-08 PROCEDURE — 700112 HCHG RX REV CODE 229: Performed by: NURSE PRACTITIONER

## 2020-02-08 PROCEDURE — 700102 HCHG RX REV CODE 250 W/ 637 OVERRIDE(OP): Performed by: NURSE PRACTITIONER

## 2020-02-08 RX ORDER — FERROUS SULFATE 325(65) MG
325 TABLET ORAL
Status: DISCONTINUED | OUTPATIENT
Start: 2020-02-08 | End: 2020-02-08 | Stop reason: HOSPADM

## 2020-02-08 RX ORDER — PSEUDOEPHEDRINE HCL 30 MG
100 TABLET ORAL 2 TIMES DAILY PRN
Qty: 60 CAP | Refills: 0 | Status: SHIPPED | OUTPATIENT
Start: 2020-02-08

## 2020-02-08 RX ORDER — IBUPROFEN 600 MG/1
600 TABLET ORAL EVERY 6 HOURS PRN
Qty: 30 TAB | Refills: 0 | Status: SHIPPED | OUTPATIENT
Start: 2020-02-08

## 2020-02-08 RX ORDER — LANOLIN ALCOHOL/MO/W.PET/CERES
325 CREAM (GRAM) TOPICAL DAILY
Qty: 30 TAB | Refills: 4 | Status: SHIPPED | OUTPATIENT
Start: 2020-02-08

## 2020-02-08 RX ADMIN — FERROUS SULFATE TAB 325 MG (65 MG ELEMENTAL FE) 325 MG: 325 (65 FE) TAB at 17:16

## 2020-02-08 RX ADMIN — DOCUSATE SODIUM 100 MG: 100 CAPSULE, LIQUID FILLED ORAL at 08:20

## 2020-02-08 RX ADMIN — VITAMIN A, VITAMIN C, VITAMIN D-3, VITAMIN E, VITAMIN B-1, VITAMIN B-2, NIACIN, VITAMIN B-6, CALCIUM, IRON, ZINC, COPPER 1 TABLET: 4000; 120; 400; 22; 1.84; 3; 20; 10; 1; 12; 200; 27; 25; 2 TABLET ORAL at 05:05

## 2020-02-08 RX ADMIN — ACETAMINOPHEN 650 MG: 325 TABLET, FILM COATED ORAL at 05:05

## 2020-02-08 RX ADMIN — ACETAMINOPHEN 650 MG: 325 TABLET, FILM COATED ORAL at 09:36

## 2020-02-08 RX ADMIN — FERROUS SULFATE TAB 325 MG (65 MG ELEMENTAL FE) 325 MG: 325 (65 FE) TAB at 11:36

## 2020-02-08 RX ADMIN — FERROUS SULFATE TAB 325 MG (65 MG ELEMENTAL FE) 325 MG: 325 (65 FE) TAB at 08:20

## 2020-02-08 RX ADMIN — IBUPROFEN 600 MG: 600 TABLET ORAL at 05:05

## 2020-02-08 RX ADMIN — IBUPROFEN 600 MG: 600 TABLET ORAL at 11:36

## 2020-02-08 ASSESSMENT — EDINBURGH POSTNATAL DEPRESSION SCALE (EPDS)
I HAVE FELT SCARED OR PANICKY FOR NO GOOD REASON: NO, NOT AT ALL
I HAVE BEEN SO UNHAPPY THAT I HAVE HAD DIFFICULTY SLEEPING: NOT AT ALL
I HAVE BLAMED MYSELF UNNECESSARILY WHEN THINGS WENT WRONG: NOT VERY OFTEN
I HAVE FELT SAD OR MISERABLE: NOT VERY OFTEN
THINGS HAVE BEEN GETTING ON TOP OF ME: NO, MOST OF THE TIME I HAVE COPED QUITE WELL
I HAVE LOOKED FORWARD WITH ENJOYMENT TO THINGS: AS MUCH AS I EVER DID
I HAVE BEEN SO UNHAPPY THAT I HAVE BEEN CRYING: NO, NEVER
I HAVE BEEN ANXIOUS OR WORRIED FOR NO GOOD REASON: HARDLY EVER
THE THOUGHT OF HARMING MYSELF HAS OCCURRED TO ME: NEVER
I HAVE BEEN ABLE TO LAUGH AND SEE THE FUNNY SIDE OF THINGS: AS MUCH AS I ALWAYS COULD

## 2020-02-08 NOTE — NON-PROVIDER
POSTPARTUM    PROGRESS  NOTE;    PATIENT ID:  NAME:  Tiara Martin  MRN:               [id number]  YOB: 1995     24 y.o. female  at 39w2d PPD#1 s/p  of viable female infant over intact perineum, complicated by PPH with EBL of 500ml.      Subjective: Tiara states she is doing well, has some cramping with breastfeeding, well managed with Ibuprofen. Bleeding has been minimal, no clots or gushes, she has been up and void. Breastfeeding is going well and denies any breast pain. Denies headache, tingling, numbness, or dizziness.      Objective:    Vitals:    20 2030 20 2110 20 2145 20 0200   BP: 122/76 132/84 128/74 103/62   Pulse: 69 67 66 66   Resp:   18 16   Temp:   37.4 °C (99.4 °F) 36.7 °C (98 °F)   TempSrc:   Temporal Temporal   SpO2:   97% 94%   Weight:       Height:         General: No acute distress, resting comfortably in bed.  HEENT: normocephalic, nontraumatic  Cardiovascular: Heart RRR. Distal Pulses 2+, no edema  Respiratory: symmetric chest expansion, lungs CTA bilaterally with no wheezes rales or rhonci  Abdomen: soft, mildly tender, fundus firm.   Genitourinary: lochia light, perineum intact, no edema.   Musculoskeletal: strength 5/5 in four extremities  Neuro: no numbness, tingling or changes in sensation.     Recent Labs     20  1103 20  0507   WBC 8.4 10.1   RBC 4.82 4.43   HEMOGLOBIN 9.5* 8.8*   HEMATOCRIT 32.0* 29.2*   MCV 66.4* 65.9*   MCH 19.7* 19.9*   RDW 49.3 47.8   PLATELETCT 173 171   NEUTSPOLYS 59.50 75.40*   LYMPHOCYTES 29.00 23.70   MONOCYTES 9.20 0.90   EOSINOPHILS 0.60 0.00   BASOPHILS 0.50 0.00   RBCMORPHOLO Present Present       Current Meds:   Current Facility-Administered Medications   Medication Dose Frequency Provider Last Rate Last Dose   • D5LR infusion   Continuous Monica Coles M.D.       • ondansetron (ZOFRAN ODT) dispertab 4 mg  4 mg Q6HRS PRN Monica Coles M.D.        Or   • ondansetron (ZOFRAN) syringe/vial  injection 4 mg  4 mg Q6HRS PRN Monica Coles M.D.   4 mg at 20 1722   • metoclopramide (REGLAN) injection 10 mg  10 mg Q6HRS PRN Monica Coles M.D.       • oxytocin (PITOCIN) infusion (for induction)  0.5-20 jerry-units/min Continuous Monica Coles M.D. 30 mL/hr at 20 1724 10 jerry-units/min at 20 172   • oxytocin (PITOCIN) infusion (for postpartum)   mL/hr Continuous Monica Coles M.D. 125 mL/hr at 20 125 mL/hr at 20   • ibuprofen (MOTRIN) tablet 600 mg  600 mg Q6HRS PRN Monica Coles M.D.   600 mg at 20 0505   • acetaminophen (TYLENOL) tablet 325 mg  325 mg Q4HRS PRN Monica Coles M.D.       • acetaminophen (TYLENOL) tablet 650 mg  650 mg Q4HRS PRN Monica Coles M.D.   650 mg at 20 0505   • ropivacaine (NAROPIN) injection   Continuous Yi Beal M.D.       • LR infusion   PRN Alison Sharad, A.P.R.N.       • PRN oxytocin (PITOCIN) (20 Units/1000 mL) PRN for excessive uterine bleeding - See Admin Instr  125-999 mL/hr Once PRN Alisonashley Orourke, A.P.R.N.       • miSOPROStol (CYTOTEC) tablet 600 mcg  600 mcg Once PRN Alison Sharad, A.P.R.N.       • methylergonovine (METHERGINE) injection 0.2 mg  0.2 mg Once PRN Alison Sharad, A.P.R.N.       • carboPROST (HEMABATE) injection 250 mcg  250 mcg Once PRN Alison Orourke, A.P.R.N.       • docusate sodium (COLACE) capsule 100 mg  100 mg BID PRN Alison Sharad, A.P.R.N.       • bisacodyl (DULCOLAX) suppository 10 mg  10 mg PRN Alison Orourke, A.P.R.N.       • magnesium hydroxide (MILK OF MAGNESIA) suspension 30 mL  30 mL Q6HRS PRN Alison Orourke, A.P.R.N.       • prenatal plus vitamin (STUARTNATAL 1+1) 27-1 MG tablet 1 Tab  1 Tab QAM Alison Orourke A.P.R.N.   1 Tab at 20 0505   Last reviewed on 2020  3:33 PM by Yi Beal M.D.       Assessment:    1. 39w2d PPD#1 s/p  with immediate PPH -EBL 500ml  2. Asymptomatic Anemia, Hgb 8.8 Hct 29.2     Plan:   1. Continue routine postpartum  care  2.   Order to start PO Iron  3.   Possible discharge home this evening if pt remains asymptomatic.     Dena VENEGAS, under supervision of Alison Orourke CNM, APRN.

## 2020-02-08 NOTE — ANESTHESIA QCDR
2019 [facility] Clinical Data Registry (for Quality Improvement)     Postoperative nausea/vomiting risk protocol (Adult = 18 yrs and Pediatric 3-17 yrs)- (430 and 463)  General inhalation anesthetic (NOT TIVA) with PONV risk factors: No  Provision of anti-emetic therapy with at least 2 different classes of agents: N/A  Patient DID NOT receive anti-emetic therapy and reason is documented in Medical Record: N/A    Multimodal Pain Management- (477)  Non-emergent surgery AND patient age >= 18: No  Use of Multimodal Pain Management, two or more drugs and/or interventions, NOT including systemic opioids:   Exception: Documented allergy to multiple classes of analgesics:     Smoking Abstinence (404)  Patient is current smoker (cigarette, pipe, e-cig, marijuanna): No  Elective Surgery:   Abstinence instructions provided prior to day of surgery:   Patient abstained from smoking on day of surgery:     Pre-Op Beta-Blocker in Isolated CABG (44)  Isolated CABG AND patient age >= 18: No  Beta-blocker admin within 24 hours of surgical incision:   Exception:of medical reason(s) for not administering beta blocker within 24 hours prior to surgical incision (e.g., not  indicated,other medical reason):     PACU assessment of acute postoperative pain prior to Anesthesia Care End- Applies to Patients Age = 18- (ABG7)  Initial PACU pain score is which of the following: < 7/10  Patient unable to report pain score: N/A    Post-anesthetic transfer of care checklist/protocol to PACU/ICU- (426 and 427)  Upon conclusion of case, patient transferred to which of the following locations: PACU/Non-ICU  Use of transfer checklist/protocol: Yes  Exclusion: Service Performed in Patient Hospital Room (and thus did not require transfer): N/A  Unplanned admission to ICU related to anesthesia service up through end of PACU care- (MD51)  Unplanned admission to ICU (not initially anticipated at anesthesia start time): No

## 2020-02-08 NOTE — CARE PLAN
Problem: Potential for postpartum infection related to presence of episiotomy/vaginal tear and/or uterine contamination  Goal: Patient will be absent from signs and symptoms of infection  Outcome: PROGRESSING AS EXPECTED  Note:   Patient vitals stable.temp wnl. Patient has no fever or chills. Patient instructed in pericare with marlen bottle and q3-4 hour pad changes. Cbc shows no signs of infection.      Problem: Alteration in comfort related to episiotomy, vaginal repair and/or after birth pains  Goal: Patient is able to ambulate, care for self and infant  Outcome: PROGRESSING AS EXPECTED  Note:   Fundus firm, lochia light,blood pressure and other vitals stable. Patient able to ambulate without dizziness. Patient intake of fluids wnl. Post delivery hematocrit and hemoglobin  is stable.

## 2020-02-08 NOTE — PROGRESS NOTES
Pt evaluated and examined by THEO Suarez. Patient encounter directly supervised by me, I was present for and performed key portions of the examination. I agree with all findings and plan of care as documented.     S: 1955 RN reports continuing trickle of blood.   O: THEO and KEVONM to bedside. Bimanual vaginal exam, fundus firm, small piece of membranes and several clots removed manually for an additional EBL of 100ml. After bimanual exam bleeding small amt with no more clots and fundus firm at U-3.      A/P: 1. Continue to evaluate for increased bleeding          2. Methergine given          3. Dr Coles notified of delivery, trailing membranes, bimanual exam with additional membranes and clots removed, and EBL.          4. Postpartum Hemorrhage - total EBL 500ml.      Dena VENEGAS, under supervision of Alison Orourke CNM, APRN

## 2020-02-08 NOTE — CARE PLAN
Problem: Altered physiologic condition related to immediate post-delivery state and potential for bleeding/hemorrhage  Goal: Patient physiologically stable as evidenced by normal lochia, palpable uterine involution and vital signs within normal limits  Outcome: PROGRESSING AS EXPECTED  Note:   Fundus firm at U, lochia rubra minimal. V/S WNL     Problem: Alteration in comfort related to episiotomy, vaginal repair and/or after birth pains  Goal: Patient verbalizes acceptable pain level  Outcome: PROGRESSING AS EXPECTED  Note:   Patient verbalized acceptable pain level at this time. Will be medicated as needed.

## 2020-02-08 NOTE — ANESTHESIA POSTPROCEDURE EVALUATION
Patient: Tiara Martin    Procedure Summary     Date:  02/07/20 Room / Location:      Anesthesia Start:  1545 Anesthesia Stop:  1925    Procedure:  Labor Epidural Diagnosis:      Scheduled Providers:   Responsible Provider:  Yi Beal M.D.    Anesthesia Type:  CSE ASA Status:  2          Final Anesthesia Type: CSE  Last vitals  BP   Blood Pressure: 122/76    Temp   36.9 °C (98.5 °F)    Pulse   Pulse: 69   Resp   17    SpO2   98 %      Anesthesia Post Evaluation    Patient location during evaluation: floor  Patient participation: complete - patient participated  Level of consciousness: awake and alert  Pain score: 0    Airway patency: patent  Anesthetic complications: no  Cardiovascular status: hemodynamically stable  Respiratory status: acceptable  Hydration status: euvolemic    PONV: none    patient able to participate, but full recovery from regional anesthesia has not occurred and is not expected within the stipulated timeframe for the completion of the evaluation

## 2020-02-08 NOTE — PROGRESS NOTES
Admitted from labor and delivery, post vag delivery in satisfactory condition. Came via wheelchair and placed to bed comfortably. IVF of LR with 20 Units of Pitocin infusing well into left hand. Assessment done, fundus firm at Umbilicus, lochia rubra minimal. Denies pain at this time, plan of care on going. Oriented to room and call light place within reach. Will continue to monitor.

## 2020-02-08 NOTE — ANESTHESIA TIME REPORT
Anesthesia Start and Stop Event Times     Date Time Event    2/7/2020 1539 Ready for Procedure     1545 Anesthesia Start     1925 Anesthesia Stop        Responsible Staff  02/07/20    Name Role Begin End    Yi Beal M.D. Anesth 1545 1925        Preop Diagnosis (Free Text):  Pre-op Diagnosis             Preop Diagnosis (Codes):    Post op Diagnosis  Labor and delivery, indication for care      Premium Reason  A. 3PM - 7AM    Comments:

## 2020-02-08 NOTE — DISCHARGE INSTRUCTIONS
POSTPARTUM DISCHARGE INSTRUCTIONS FOR MOM    YOB: 1995   Age: 24 y.o.               Admit Date: 2020     Discharge Date: 2020  Attending Doctor:  Monica Coles M.D.                  Allergies:  Shellfish allergy    Discharged to home by car. Discharged via wheelchair, hospital escort: Yes.  Special equipment needed: Not Applicable  Belongings with: Personal  Be sure to schedule a follow-up appointment with your primary care doctor or any specialists as instructed.     Discharge Plan:   Diet Plan: Discussed  Activity Level: Discussed  Confirmed Follow up Appointment: Patient to Call and Schedule Appointment  Confirmed Symptoms Management: Discussed  Influenza Vaccine Indication: Patient Refuses    REASONS TO CALL YOUR OBSTETRICIAN:  1.   Persistent fever or shaking chills (Temperature higher than 100.4)  2.   Heavy bleeding (soaking more than 1 pad per hour); Passing clots  3.   Foul odor from vagina  4.   Mastitis (Breast infection; breast pain, chills, fever, redness)  5.   Urinary pain, burning or frequency  6.   Episiotomy infection  7.   Abdominal incision infection  8.   Severe depression longer than 24 hours    HAND WASHING  · Prior to handling the baby.  · Before breastfeeding or bottle feeding baby.  · After using the bathroom or changing the baby's diaper.    WOUND CARE  Ask your physician for additional care instructions.  In general:    ·  Incision:      · Keep clean and dry.    · Do NOT lift anything heavier than your baby for up to 6 weeks.    · There should not be any opening or pus.      VAGINAL CARE  · Nothing inside vagina for 6 weeks: no sexual intercourse, tampons or douching.  · Bleeding may continue for 2-4 weeks.  Amount may vary.    · Call your physician for heavy bleeding which means soaking more than 1 pad per hour    BIRTH CONTROL  · It is possible to become pregnant at any time after delivery and while breastfeeding.  · Plan to discuss a method of birth  "control with your physician at your follow up visit. visit.    DIET AND ELIMINATION  · Eating more fiber (bran cereal, fruits, and vegetables) and drinking plenty of fluids will help to avoid constipation.  · Urinary frequency after childbirth is normal.    POSTPARTUM BLUES  During the first few days after birth, you may experience a sense of the \"blues\" which may include impatience, irritability or even crying.  These feeling come and go quickly.  However, as many as 1 in 10 women experience emotional symptoms known as postpartum depression.    Postpartum depression:  May start as early as the second or third day after delivery or take several weeks or months to develop.  Symptoms of \"blues\" are present, but are more intense:  Crying spells; loss of appetite; feelings of hopelessness or loss of control; fear of touching the baby; over concern or no concern at all about the baby; little or no concern about your own appearance/caring for yourself; and/or inability to sleep or excessive sleeping.  Contact your physician if you are experiencing any of these symptoms.    Crisis Hotline:  · [place] Crisis Hotline:  5-249-QTAFGRH  Or 1-398.288.7684  · [place] Crisis Hotline:  1-717.523.6884  Or 150-840-0768    PREVENTING SHAKEN BABY:  If you are angry or stressed, PUT THE BABY IN THE CRIB, step away, take some deep breaths, and wait until you are calm to care for the baby.  DO NOT SHAKE THE BABY.  You are not alone, call a supporter for help.    · Crisis Call Center 24/7 crisis line 212-320-6248 or 1-625.973.9385  · You can also text them, text \"ANSWER\" to 770064    QUIT SMOKING/TOBACCO USE:  I understand the use of any tobacco products increases my chance of suffering from future heart disease and could cause other illnesses which may shorten my life. Quitting the use of tobacco products is the single most important thing I can do to improve my health. For further information on smoking / tobacco cessation call a Toll " Free Quit Line at 1-217.954.2390 (*National [employer] [place]) or 1-871.622.4406 (American Lung Association) or you can access the web based program at www.lungusa.org.    · [place] Tobacco Users Help Line:  (392) 366-2008       Toll Free: 1-806.344.6627  · Quit Tobacco Program [facility] (921)699-0814    DEPRESSION / SUICIDE RISK:  As you are discharged from this [facility], it is important to learn how to keep safe from harming yourself.    Recognize the warning signs:  · Abrupt changes in personality, positive or negative- including increase in energy   · Giving away possessions  · Change in eating patterns- significant weight changes-  positive or negative  · Change in sleeping patterns- unable to sleep or sleeping all the time   · Unwillingness or inability to communicate  · Depression  · Unusual sadness, discouragement and loneliness  · Talk of wanting to die  · Neglect of personal appearance   · Rebelliousness- reckless behavior  · Withdrawal from people/activities they love  · Confusion- inability to concentrate     If you or a loved one observes any of these behaviors or has concerns about self-harm, here's what you can do:  · Talk about it- your feelings and reasons for harming yourself  · Remove any means that you might use to hurt yourself (examples: pills, rope, extension cords, firearm)  · Get professional help from the community (Mental Health, Substance Abuse, psychological counseling)  · Do not be alone:Call your Safe Contact- someone whom you trust who will be there for you.  · Call your local CRISIS HOTLINE 088-7771 or 542-152-4909  · Call your local Children's Mobile Crisis Response Team [place] (383) 930-2404 or www.[facility]  · Call the toll free National Suicide Prevention Hotlines   · National Suicide Prevention Lifeline 859-139-IBRG (4727)  · National Hope Line Network 800-SUICIDE (175-0446)    DISCHARGE SURVEY:  Thank you for choosing [facility].  We hope we provided you with very good care.  You may be receiving a survey in the mail.  Please fill it out.  Your opinion is valuable to us.    ADDITIONAL EDUCATIONAL MATERIALS GIVEN TO PATIENT:        My signature on this form indicates that:  1.  I have reviewed and understand the above information  2.  My questions regarding this information have been answered to my satisfaction.  3.  I have formulated a plan with my discharge nurse to obtain my prescribed medication for home.

## 2020-02-08 NOTE — PROGRESS NOTES
Assessment done. Pt. Has no c/o pain.Fundus firm.Lochia light. Pt. Caring for baby with good skill. Breastfeeding going well.Pt. Has received information on education .plan of care for today discussed.

## 2020-02-08 NOTE — NON-PROVIDER
S: 1955 RN reports continuing trickle of blood.   O: SNM and CNM to bedside. Bimanual vaginal exam, fundus firm, small piece of membranes and several clots removed manually for an additional EBL of 100ml. After bimanual exam bleeding small amt with no more clots and fundus firm at U-3.     A/P: 1. Continue to evaluate for increased bleeding          2. Dr Coles notified of delivery, trailing membranes, bimanual exam with additional membranes and clots removed, and EBL.          3. Postpartum Hemorrhage - total EBL 500ml.     Dena VENEGAS, under supervision of Alison Orourke CNM, APRN

## 2020-02-08 NOTE — DISCHARGE SUMMARY
Discharge Summary:      Tiara Martin    Admit Date:   2020  Discharge Date:  2020     Admitting diagnosis:  Pregnancy  39 weeks gestation of pregnancy  39 weeks gestation of pregnancy  Discharge Diagnosis: Status post vaginal, spontaneous.  Pregnancy Complications: none  Tubal Ligation:  no        History:  Past Medical History:   Diagnosis Date   • Anemia affecting pregnancy in third trimester - ..6 2019     OB History    Para Term  AB Living   4 4 4     4   SAB TAB Ectopic Molar Multiple Live Births           0 4      # Outcome Date GA Lbr Ervin/2nd Weight Sex Delivery Anes PTL Lv   4 Term 20 39w2d / 00:13 3.55 kg (7 lb 13.2 oz) F Vag-Spont EPI N RAKESH   3 Term 18 39w0d  2.977 kg (6 lb 9 oz) M Vag-Spont EPI N RAKESH      Birth Comments: Pt states was told bottom of her uterus wasn't closing.    2 Term 16 39w0d  2.92 kg (6 lb 7 oz) M Vag-Spont EPI N RAKESH      Birth Comments: Pt states no complications.    1 Term 14 39w0d  2.977 kg (6 lb 9 oz) F Vag-Spont EPI N RAKESH      Birth Comments: Pt states no complications.         Shellfish allergy  Patient Active Problem List    Diagnosis Date Noted   • Anemia affecting pregnancy in third trimester - .6 2019   • History of postpartum hemorrhage, currently pregnant in third trimester 2019   • History of placenta abruption 2019   • Supervision of other normal pregnancy 2019        Hospital Course:   24 y.o. , now para 4, was admitted with the above mentioned diagnosis, underwent Active Labor, vaginal, spontaneous. Patient postpartum course was unremarkable, with progressive advancement in diet , ambulation and toleration of oral analgesia. Patient without complaints today and desires discharge.      Vitals:    20 2110 20 2145 20 0200 20 0600   BP: 132/84 128/74 103/62 113/67   Pulse: 67 66 66 (!) 59   Resp:  18 16 16   Temp:  37.4 °C (99.4 °F) 36.7 °C  (98 °F) 36.5 °C (97.7 °F)   TempSrc:  Temporal Temporal Temporal   SpO2:  97% 94% 94%   Weight:       Height:           Current Facility-Administered Medications   Medication Dose   • ferrous sulfate tablet 325 mg  325 mg   • D5LR infusion     • ondansetron (ZOFRAN ODT) dispertab 4 mg  4 mg    Or   • ondansetron (ZOFRAN) syringe/vial injection 4 mg  4 mg   • metoclopramide (REGLAN) injection 10 mg  10 mg   • oxytocin (PITOCIN) infusion (for induction)  0.5-20 jerry-units/min   • oxytocin (PITOCIN) infusion (for postpartum)   mL/hr   • ibuprofen (MOTRIN) tablet 600 mg  600 mg   • acetaminophen (TYLENOL) tablet 325 mg  325 mg   • acetaminophen (TYLENOL) tablet 650 mg  650 mg   • ropivacaine (NAROPIN) injection     • LR infusion     • PRN oxytocin (PITOCIN) (20 Units/1000 mL) PRN for excessive uterine bleeding - See Admin Instr  125-999 mL/hr   • miSOPROStol (CYTOTEC) tablet 600 mcg  600 mcg   • methylergonovine (METHERGINE) injection 0.2 mg  0.2 mg   • carboPROST (HEMABATE) injection 250 mcg  250 mcg   • docusate sodium (COLACE) capsule 100 mg  100 mg   • bisacodyl (DULCOLAX) suppository 10 mg  10 mg   • magnesium hydroxide (MILK OF MAGNESIA) suspension 30 mL  30 mL   • prenatal plus vitamin (STUARTNATAL 1+1) 27-1 MG tablet 1 Tab  1 Tab       Exam:  Breast Exam: negative  Abdomen: Abdomen soft, non-tender. BS normal. No masses,  No organomegaly  Fundus Non Tender: yes  Incision: none  Perineum: perineum intact  Extremity: extremities, peripheral pulses and reflexes normal     Labs:  Recent Labs     02/07/20  1103 02/08/20  0507   WBC 8.4 10.1   RBC 4.82 4.43   HEMOGLOBIN 9.5* 8.8*   HEMATOCRIT 32.0* 29.2*   MCV 66.4* 65.9*   MCH 19.7* 19.9*   MCHC 29.7* 30.1*   RDW 49.3 47.8   PLATELETCT 173 171        Activity:   Discharge to home  Pelvic Rest x 6 weeks    Assessment:  normal postpartum course  Discharge Assessment: Taking in adequate diet and fluids, no heavy bleeding or foul smelling discharge, no redness or  severe pain of breasts, voiding without difficulty. Pt desires permanent sterilization     [facility]    Call or come to ED for: heavy vaginal bleeding, fever >100.4, severe abdominal pain, severe headache, chest pain, shortness of breath,  N/V, incisional drainage, or other concerns     Discharge Meds:   Current Outpatient Medications   Medication Sig Dispense Refill   • ibuprofen (MOTRIN) 600 MG Tab Take 1 Tab by mouth every 6 hours as needed (For cramping after delivery; do not give if patient is receiving ketorolac (Toradol)). 30 Tab 0   • docusate sodium 100 MG Cap Take 100 mg by mouth 2 times a day as needed for Constipation. 60 Cap 0   • ferrous sulfate 325 (65 Fe) MG EC tablet Take 1 Tab by mouth every day. 30 Tab 4       MICHAEL Clinton.

## 2020-02-08 NOTE — NON-PROVIDER
OB Vaginal Delivery Note    2020  Tiara Martin  24 y.o.    Labor Details: Patient was admitted to Labor and Delivery at 39w2d for elective induction of labor. Admission VE 2/60%/-2, Pitocin IOL was started at 1123, pt progressed, getting epidural at 1549, was comfortable with epidural. @1912 pt c/o pressure/urge to push, VE C/C/+1.     Review the Delivery Report for details.     Gestational Age: 39w2d  /Para:   Labor Complications: None   Blood Loss: 500  IO Blood Loss  20 0725 - 20 194    None          Delivery Type:  at 1925  ROM to Delivery Time: 1h 01m   Sex: Female   Weight: 3550g   1 Minute 5 Minute 10 Minute   Apgar Totals: 8    9            Delivery Details:  Tiara Martin, delia 24 y.o.  female delivered a viable Female infant with Apgars of 8   and 9  . Patient was fully dilated and pushing after 4 hours  in active labor. The patient was put in the dorsal lithotomy position. Delivery was via  to a sterile field under Epidural  anesthesia. Infant delivered in Vertex   Occiput  Anterior  position. Anterior and posterior shoulders delivered without difficulty. Infant was placed on mothers abdomen.     The cord was clamped twice and cut, 3 vessels were noted. Nuchal cord was loose  with 1  loops and was reduced .  Placenta delivered at 1930 with trailing membranes, coaxed out with ring forceps. Placental disposition: discarded. Fundal massage performed and fundus found to be firm. Cytotec 800mcg placed rectally. Perineum, vagina, cervix were inspected, and no lacerations were noted. 2 small bilateral abrasions noted that are hemostatic. No repair required. Initial EBL 400ml.     1940 RN reported continuing trickle of blood. Bimanual exam done, fundus firm, small piece of membranes and several clots removed manually, additional 100ml EBL. Methergine given.     Total EBL 500ml.     Infant and patient in delivery room in good and  stable condition.      Dena Amador, Student SN, under direct supervision of Alison Coles notified of delivery, trailing membranes and EBL.

## 2020-02-08 NOTE — PROGRESS NOTES
- Bedside report received from RIKKI Mueller RN. POC discussed, care assumed. Pt is comfortable w/ epidural. Pt says she feels pressure but not an urge to push yet. Pt encouraged to all RN with any needs, questions or concerns. Pt placed in throne position.     - Pt reports feeling increased pressure. SVE as noted, pt complete. Will set up for delivery and begin pushing.    - Began pushing, pt pushing well.     -  of viable female infant. Infant skin to skin w/ mother. APGARS 8/9.     - Spontaneous delivery of intact placenta. Pitocin bolus initiated.     - Moderate bleeding continuously flowing w/ clots. BILL Orourke called with updates.     - BILL Orourek CNM and SINDHU Amador CNM student to bedside. Manual exam performed and retained membranes removed. Methergine given, see MAR.     - Pt bleeding stable. Pt up to bathroom w/ RN assist. Pt steady w/ ambulation and voids at this time. Sofia care performed.    - Pt transferred to PPU via wheelchair w/ infant in arms. Report given to PP, RN. Bands checked, were correct.

## 2020-02-08 NOTE — PROCEDURES
Pt evaluated and examinined by THEO Suarez. Patient delivery was attended by THEO, I was in the room for the entire delivery. Patient encounter directly supervised by me, I agree with all findings and plan of care as documented.     OB Vaginal Delivery Note     2020  Tiara Martin  24 y.o.     Labor Details: Patient was admitted to Labor and Delivery at 39w2d for elective induction of labor. Admission VE 2/60%/-2, Pitocin IOL was started at 1123, pt progressed normally, she recd an epidural at 1549, was comfortable with epidural. : AROM by Dr Coles. Pt progressed to complete @191 pt c/o pressure/urge to push. Pushing efforts were effective.     Review the Delivery Report for details.      Gestational Age: 39w2d  /Para:   Labor Complications: None   Blood Loss: 400         IO Blood Loss  20 0725 - 20 1946     None              Delivery Type:  at 1925  ROM to Delivery Time: 1h 01m   Sex: Female  [place] Weight: 3550g    1 Minute 5 Minute 10 Minute   Apgar Totals: 8    9             Delivery Details:  Tiara Martin, a 24 y.o.  female delivered a viable Female infant with Apgars of 8  and 9  The patient was positioned in the dorsal lithotomy position. Delivery was via  to a sterile field under Epidural  anesthesia. GBS negative. Infant head delivered in ELBA position.Therer was a loose nuchal cord which was reduced . External rotation to LOT. Head and anterior  shoulders delivered with slight downward traction without difficulty. The rest of the infant body delivered uneventfully.  The vigorous infant was placed on mothers abdomen and cord clamping was delayed.    Afterward the cord was clamped twice and cut, 3 vessels were noted. Thwe infant was repositioned to mothers chest skin to skin. Placenta delivered at 1930 with trailing membranes, coaxed out with ring forceps. Placental disposition: intact, with detached membranes. Uterus  swept with ring forceps and 4x8 sponge. Fundal massage performed and fundus found to be firm. Cytotec 800mcg placed rectally due to hx of PPH and current EBL of 400 cc. Perineum, vagina, cervix were inspected, bilateral hemostatic labial abrasions noted. No repair required.     Infant and patient in delivery room in good and stable condition.     Dena Amador, Student [facility], under direct supervision of Alison Orourke CNM

## 2020-02-09 NOTE — PROGRESS NOTES
Educated patient about self care and  care, all questions answered. Pt. Bonding well with infant, educated about feeding times and amount. Pt. Discharged home, escorted to car. Will follow up with [facility].

## 2020-02-10 ENCOUNTER — TELEPHONE (OUTPATIENT)
Dept: OBGYN | Facility: CLINIC | Age: 25
End: 2020-02-10

## 2020-02-10 NOTE — TELEPHONE ENCOUNTER
Pt called left msg requesting to call her back re:insurance verification.    Unable to contact pt, msg left to call back.

## 2020-03-30 ENCOUNTER — POST PARTUM (OUTPATIENT)
Dept: OBGYN | Facility: CLINIC | Age: 25
End: 2020-03-30
Payer: MEDICAID

## 2020-03-30 VITALS — WEIGHT: 190 LBS | SYSTOLIC BLOOD PRESSURE: 112 MMHG | BODY MASS INDEX: 28.06 KG/M2 | DIASTOLIC BLOOD PRESSURE: 68 MMHG

## 2020-03-30 PROBLEM — O09.293 HISTORY OF POSTPARTUM HEMORRHAGE, CURRENTLY PREGNANT IN THIRD TRIMESTER: Status: RESOLVED | Noted: 2019-11-22 | Resolved: 2020-03-30

## 2020-03-30 PROBLEM — Z34.80 SUPERVISION OF OTHER NORMAL PREGNANCY: Status: RESOLVED | Noted: 2019-09-09 | Resolved: 2020-03-30

## 2020-03-30 PROBLEM — Z87.59 HISTORY OF PLACENTA ABRUPTION: Status: RESOLVED | Noted: 2019-11-22 | Resolved: 2020-03-30

## 2020-03-30 PROBLEM — O99.013 ANEMIA AFFECTING PREGNANCY IN THIRD TRIMESTER: Status: RESOLVED | Noted: 2019-12-06 | Resolved: 2020-03-30

## 2020-03-30 PROCEDURE — 0503F POSTPARTUM CARE VISIT: CPT | Performed by: PHYSICIAN ASSISTANT

## 2020-03-30 RX ORDER — ACETAMINOPHEN AND CODEINE PHOSPHATE 120; 12 MG/5ML; MG/5ML
1 SOLUTION ORAL DAILY
Qty: 28 TAB | Refills: 11 | Status: SHIPPED | OUTPATIENT
Start: 2020-03-30

## 2020-03-30 ASSESSMENT — ENCOUNTER SYMPTOMS
CONSTITUTIONAL NEGATIVE: 1
DEPRESSION: 0
MUSCULOSKELETAL NEGATIVE: 1
PSYCHIATRIC NEGATIVE: 1
CARDIOVASCULAR NEGATIVE: 1
NEUROLOGICAL NEGATIVE: 1
EYES NEGATIVE: 1
RESPIRATORY NEGATIVE: 1
GASTROINTESTINAL NEGATIVE: 1

## 2020-03-30 ASSESSMENT — EDINBURGH POSTNATAL DEPRESSION SCALE (EPDS)
I HAVE BLAMED MYSELF UNNECESSARILY WHEN THINGS WENT WRONG: NOT VERY OFTEN
I HAVE BEEN SO UNHAPPY THAT I HAVE HAD DIFFICULTY SLEEPING: NOT AT ALL
I HAVE FELT SAD OR MISERABLE: NO, NOT AT ALL
I HAVE BEEN ANXIOUS OR WORRIED FOR NO GOOD REASON: YES, SOMETIMES
I HAVE BEEN SO UNHAPPY THAT I HAVE BEEN CRYING: NO, NEVER
I HAVE BEEN ABLE TO LAUGH AND SEE THE FUNNY SIDE OF THINGS: AS MUCH AS I ALWAYS COULD
I HAVE FELT SCARED OR PANICKY FOR NO GOOD REASON: YES, SOMETIMES
TOTAL SCORE: 6
THE THOUGHT OF HARMING MYSELF HAS OCCURRED TO ME: NEVER
THINGS HAVE BEEN GETTING ON TOP OF ME: NO, MOST OF THE TIME I HAVE COPED QUITE WELL
I HAVE LOOKED FORWARD WITH ENJOYMENT TO THINGS: AS MUCH AS I EVER DID

## 2020-03-30 NOTE — LETTER
March 30, 2020       Patient: Tiara Martin   YOB: 1995   Date of Visit: 3/30/2020         To Whom It May Concern:    It is my medical opinion that Tiara Matrin may return to full duty, no restrictions as of 3/30/20. Thank you.     If you have any questions or concerns, please don't hesitate to call [phone]          Sincerely,          ROCKY Mosquera.

## 2020-03-30 NOTE — PROGRESS NOTES
Pt here today for postpartum exam.  States  Off and on brownish spotting x 2 days   Delivery Date 02/07/20   Currently: breast feeding   BCM: was on the pill before, Would like to go back on it.   LMP: not yet  Good ph: [phone]  Pharmacy confirmed w/ pt   Last pap: 09/2019 WNL- copy given to pt.

## 2020-03-30 NOTE — PROGRESS NOTES
Subjective:      Tiara Martin is a 24 y.o. female who presents with Postpartum visit today. 7 wk s/p  at term without complications. Pt has no complaints- denies heavy vaginal bleeding, depression, intercourse, pain or problems with BF. PAP wnl  - repeat . BCM desired is BCP - risks d/w pt, instructions given and rx written for [facility].          HPI    Review of Systems   Constitutional: Negative.    HENT: Negative.    Eyes: Negative.    Respiratory: Negative.    Cardiovascular: Negative.    Gastrointestinal: Negative.    Genitourinary: Negative.    Musculoskeletal: Negative.    Skin: Negative.    Neurological: Negative.    Endo/Heme/Allergies: Negative.    Psychiatric/Behavioral: Negative.  Negative for depression.   All other systems reviewed and are negative.         Objective:     /68   Wt 86.2 kg (190 lb)   LMP 2019   BMI 28.06 kg/m²      Physical Exam  Vitals signs reviewed.   Constitutional:       Appearance: She is well-developed.   HENT:      Head: Normocephalic and atraumatic.   Eyes:      Pupils: Pupils are equal, round, and reactive to light.   Neck:      Musculoskeletal: Normal range of motion and neck supple.      Thyroid: No thyromegaly.   Cardiovascular:      Rate and Rhythm: Normal rate and regular rhythm.      Heart sounds: Normal heart sounds.   Pulmonary:      Effort: Pulmonary effort is normal. No respiratory distress.      Breath sounds: Normal breath sounds.   Abdominal:      General: Bowel sounds are normal. There is no distension.      Palpations: Abdomen is soft.      Tenderness: There is no abdominal tenderness.   Genitourinary:     Exam position: Supine.      Labia:         Right: No rash or tenderness.         Left: No rash or tenderness.       Vagina: Normal. No signs of injury and foreign body. No vaginal discharge or erythema.      Cervix: No cervical motion tenderness.      Uterus: Not deviated, not enlarged and not tender.       Adnexa:          Right: No mass or tenderness.          Left: No mass or tenderness.     Skin:     General: Skin is warm and dry.      Findings: No erythema.   Neurological:      Mental Status: She is alert.      Deep Tendon Reflexes: Reflexes are normal and symmetric.   Psychiatric:         Behavior: Behavior normal.         Thought Content: Thought content normal.                 Assessment/Plan:       1. Postpartum care following vaginal delivery  - PAP 9/22, as last wnl 9/19  - norethindrone (MICRONOR) 0.35 MG tablet; Take 1 Tab by mouth every day.  Dispense: 28 Tab; Refill: 11

## 2023-04-11 ENCOUNTER — HOSPITAL ENCOUNTER (OUTPATIENT)
Dept: RADIOLOGY | Facility: MEDICAL CENTER | Age: 28
End: 2023-04-11
Attending: NURSE PRACTITIONER
Payer: MEDICAID

## 2023-04-11 DIAGNOSIS — N64.4 PAINFUL BREASTS: ICD-10-CM

## 2023-04-11 PROCEDURE — 76642 ULTRASOUND BREAST LIMITED: CPT | Mod: LT

## 2024-03-05 ENCOUNTER — NON-PROVIDER VISIT (OUTPATIENT)
Dept: OCCUPATIONAL MEDICINE | Facility: CLINIC | Age: 29
End: 2024-03-05

## 2024-03-05 DIAGNOSIS — Z11.1 ENCOUNTER FOR PPD TEST: Primary | ICD-10-CM

## 2024-03-05 PROCEDURE — 86580 TB INTRADERMAL TEST: CPT | Performed by: NURSE PRACTITIONER

## 2024-03-07 ENCOUNTER — NON-PROVIDER VISIT (OUTPATIENT)
Dept: OCCUPATIONAL MEDICINE | Facility: CLINIC | Age: 29
End: 2024-03-07

## 2024-03-07 LAB — TB WHEAL 3D P 5 TU DIAM: NORMAL MM

## 2024-07-31 ENCOUNTER — APPOINTMENT (OUTPATIENT)
Dept: URGENT CARE | Facility: CLINIC | Age: 29
End: 2024-07-31
Payer: MEDICAID